# Patient Record
Sex: FEMALE | Race: WHITE | NOT HISPANIC OR LATINO
[De-identification: names, ages, dates, MRNs, and addresses within clinical notes are randomized per-mention and may not be internally consistent; named-entity substitution may affect disease eponyms.]

---

## 2020-01-30 PROBLEM — Z00.00 ENCOUNTER FOR PREVENTIVE HEALTH EXAMINATION: Status: ACTIVE | Noted: 2020-01-30

## 2020-02-06 ENCOUNTER — RESULT REVIEW (OUTPATIENT)
Age: 85
End: 2020-02-06

## 2020-02-06 ENCOUNTER — APPOINTMENT (OUTPATIENT)
Dept: WOUND CARE | Facility: HOSPITAL | Age: 85
End: 2020-02-06
Payer: MEDICARE

## 2020-02-06 VITALS
SYSTOLIC BLOOD PRESSURE: 152 MMHG | RESPIRATION RATE: 18 BRPM | DIASTOLIC BLOOD PRESSURE: 82 MMHG | HEART RATE: 85 BPM | TEMPERATURE: 97.8 F

## 2020-02-06 VITALS — BODY MASS INDEX: 24.54 KG/M2 | HEIGHT: 60 IN | WEIGHT: 125 LBS

## 2020-02-06 DIAGNOSIS — Z78.9 OTHER SPECIFIED HEALTH STATUS: ICD-10-CM

## 2020-02-06 PROCEDURE — 11044 DBRDMT BONE 1ST 20 SQ CM/<: CPT

## 2020-02-06 PROCEDURE — 99205 OFFICE O/P NEW HI 60 MIN: CPT | Mod: 25

## 2020-02-11 LAB — BACTERIA SPEC CULT: ABNORMAL

## 2020-02-13 ENCOUNTER — APPOINTMENT (OUTPATIENT)
Dept: WOUND CARE | Facility: CLINIC | Age: 85
End: 2020-02-13
Payer: MEDICARE

## 2020-02-13 PROCEDURE — 99214 OFFICE O/P EST MOD 30 MIN: CPT | Mod: 25

## 2020-02-13 PROCEDURE — 11044 DBRDMT BONE 1ST 20 SQ CM/<: CPT | Mod: 58

## 2020-02-14 NOTE — PLAN
[FreeTextEntry1] : 2/6/20Plan - culture obtained, \par \par pathology obtained, awaiting\par  script to pharmacy- renewed

## 2020-02-14 NOTE — PHYSICAL EXAM
[Normal Breath Sounds] : Normal breath sounds [Normal Rate and Rhythm] : normal rate and rhythm [4 x 4] : 4 x 4  [JVD] : no jugular venous distention  [de-identified] : nad [de-identified] : pus [de-identified] : infected [de-identified] : posterior malleolus left [de-identified] : .6 [de-identified] : .9 [de-identified] : 0.5 [de-identified] : gent [de-identified] : .7x.8 bruise dti 5th mt mid [FreeTextEntry1] : 4 th lateral toe web [FreeTextEntry2] : 1 [FreeTextEntry4] : 0.2 [FreeTextEntry3] : 0.9 [de-identified] : iodorb [de-identified] : 1/1/0.2 [FreeTextEntry7] : 5 th toe left [FreeTextEntry8] : 2.5 [FreeTextEntry9] : 3.5 [de-identified] : 0.3 [de-identified] : portion of necrotic bone removed [de-identified] : betadine [FreeTextEntry6] : toe cyanotic/gangrenous [de-identified] : 1.5/1/0.3 [TWNoteComboBox6] : Arterial [de-identified] : Macerated [de-identified] : Mild [de-identified] : Yes [de-identified] : <20% [de-identified] : 50% [de-identified] : 2.5% Lidocaine Topical [de-identified] : Arterial [de-identified] : Mild [de-identified] : Yes [de-identified] : 50% [de-identified] : <20% [de-identified] : 2.5% Lidocaine Topical [de-identified] : Left [de-identified] : Yes [de-identified] : Traumatic [de-identified] : 2.5% Lidocaine Topical [de-identified] : Mechanical [de-identified] : 100% [TWNoteComboBox1] : Left [de-identified] : 2.5% Lidocaine Topical [de-identified] : Bone [de-identified] : 100% [TWNoteComboBox7] : Mechanical [TWNoteComboBox9] : Left [de-identified] : 2.5% Lidocaine Topical [de-identified] : Bone [TWNoteComboBox8] : Citlali [de-identified] : Debridement non-excisional

## 2020-02-14 NOTE — ASSESSMENT
[FreeTextEntry1] : 89 yr old woman CAD HTN on AC with necrotic gangrenous left 5th toe, medial and lateral left leg ulcers on ac afib, htn and s/p cad with stents\par will extend antibiotics to 10 days/ tough stick\par  3 months  duration.   pain and drainage.  \par denies fevers or chills.  no change in urine-  wants to wait on labs for renal function\par  gent on wounds and dakins wet to dry\par   worse necrotic, foul smelling wounds  on 4th toe tolerated sharp debridement well, minimal bleeding\par  left calf wounds improved \par await path for 5th toe with osteo- will need ID referral

## 2020-02-14 NOTE — HISTORY OF PRESENT ILLNESS
[FreeTextEntry1] : Ms. LOTTE BLOCH   presents to the office with a wound for 3 months  duration.  \par had been seen using dakins wet to dry and gent on wounds\par culture positive for ecoli and enterococus faecalis\par crushing the antibiotic cipro and flagyl\par The wounds are is located on  the left calf  and 5th toe. Portion of toe bone sent for r/o osteomyelitis, gangrenous 5th toe , previous mackenzie 1.2 in 2012 , probable pad now at age  89\par \par .  The patient has complaints of pain and drainage. \par  The patient previouslywith neopsporin. The patient denies fevers or chills.  \par The patient has localized pain to the wound upon dressing changes. \par  The patient has no other complaints or associated symptoms.  The pt. had fallen approx. 3 months ago was initially taken to [facility], then followed up with primary care who placed on doxy., just started 2nd course of it.\par \par

## 2020-02-21 ENCOUNTER — APPOINTMENT (OUTPATIENT)
Dept: WOUND CARE | Facility: CLINIC | Age: 85
End: 2020-02-21
Payer: MEDICARE

## 2020-02-21 ENCOUNTER — INPATIENT (INPATIENT)
Facility: HOSPITAL | Age: 85
LOS: 6 days | Discharge: HOME | DRG: 300 | End: 2020-02-28
Attending: INTERNAL MEDICINE | Admitting: INTERNAL MEDICINE
Payer: MEDICARE

## 2020-02-21 VITALS
HEIGHT: 60 IN | RESPIRATION RATE: 18 BRPM | HEART RATE: 88 BPM | SYSTOLIC BLOOD PRESSURE: 126 MMHG | WEIGHT: 123.02 LBS | DIASTOLIC BLOOD PRESSURE: 84 MMHG | OXYGEN SATURATION: 96 % | TEMPERATURE: 98 F

## 2020-02-21 VITALS
DIASTOLIC BLOOD PRESSURE: 82 MMHG | SYSTOLIC BLOOD PRESSURE: 126 MMHG | HEART RATE: 76 BPM | TEMPERATURE: 97.5 F | RESPIRATION RATE: 16 BRPM

## 2020-02-21 DIAGNOSIS — I96 GANGRENE, NOT ELSEWHERE CLASSIFIED: ICD-10-CM

## 2020-02-21 DIAGNOSIS — Z90.10 ACQUIRED ABSENCE OF UNSPECIFIED BREAST AND NIPPLE: Chronic | ICD-10-CM

## 2020-02-21 LAB
ALBUMIN SERPL ELPH-MCNC: 3.3 G/DL — SIGNIFICANT CHANGE UP (ref 3.3–5)
ALP SERPL-CCNC: 100 U/L — SIGNIFICANT CHANGE UP (ref 40–120)
ALT FLD-CCNC: 13 U/L — SIGNIFICANT CHANGE UP (ref 10–45)
ANION GAP SERPL CALC-SCNC: 12 MMOL/L — SIGNIFICANT CHANGE UP (ref 5–17)
APTT BLD: 30.4 SEC — SIGNIFICANT CHANGE UP (ref 27.5–36.3)
AST SERPL-CCNC: 34 U/L — SIGNIFICANT CHANGE UP (ref 10–40)
BASE EXCESS BLDV CALC-SCNC: 4.8 MMOL/L — HIGH (ref -2–2)
BASOPHILS # BLD AUTO: 0.1 K/UL — SIGNIFICANT CHANGE UP (ref 0–0.2)
BASOPHILS NFR BLD AUTO: 0.6 % — SIGNIFICANT CHANGE UP (ref 0–2)
BILIRUB SERPL-MCNC: 0.3 MG/DL — SIGNIFICANT CHANGE UP (ref 0.2–1.2)
BUN SERPL-MCNC: 20 MG/DL — SIGNIFICANT CHANGE UP (ref 7–23)
CA-I SERPL-SCNC: 1.15 MMOL/L — SIGNIFICANT CHANGE UP (ref 1.12–1.3)
CALCIUM SERPL-MCNC: 9.4 MG/DL — SIGNIFICANT CHANGE UP (ref 8.4–10.5)
CHLORIDE BLDV-SCNC: 101 MMOL/L — SIGNIFICANT CHANGE UP (ref 96–108)
CHLORIDE SERPL-SCNC: 96 MMOL/L — SIGNIFICANT CHANGE UP (ref 96–108)
CO2 BLDV-SCNC: 32 MMOL/L — HIGH (ref 22–30)
CO2 SERPL-SCNC: 26 MMOL/L — SIGNIFICANT CHANGE UP (ref 22–31)
CREAT SERPL-MCNC: 0.91 MG/DL — SIGNIFICANT CHANGE UP (ref 0.5–1.3)
EOSINOPHIL # BLD AUTO: 0.37 K/UL — SIGNIFICANT CHANGE UP (ref 0–0.5)
EOSINOPHIL NFR BLD AUTO: 2.1 % — SIGNIFICANT CHANGE UP (ref 0–6)
ERYTHROCYTE [SEDIMENTATION RATE] IN BLOOD: 90 MM/HR — HIGH (ref 0–20)
GAS PNL BLDV: 132 MMOL/L — LOW (ref 135–145)
GAS PNL BLDV: SIGNIFICANT CHANGE UP
GAS PNL BLDV: SIGNIFICANT CHANGE UP
GLUCOSE BLDV-MCNC: 94 MG/DL — SIGNIFICANT CHANGE UP (ref 70–99)
GLUCOSE SERPL-MCNC: 95 MG/DL — SIGNIFICANT CHANGE UP (ref 70–99)
HCO3 BLDV-SCNC: 30 MMOL/L — HIGH (ref 21–29)
HCT VFR BLD CALC: 33.9 % — LOW (ref 34.5–45)
HCT VFR BLDA CALC: 34 % — LOW (ref 39–50)
HGB BLD CALC-MCNC: 10.9 G/DL — LOW (ref 11.5–15.5)
HGB BLD-MCNC: 10.5 G/DL — LOW (ref 11.5–15.5)
IMM GRANULOCYTES NFR BLD AUTO: 1.5 % — SIGNIFICANT CHANGE UP (ref 0–1.5)
INR BLD: 1.22 RATIO — HIGH (ref 0.88–1.16)
LACTATE BLDV-MCNC: 2 MMOL/L — SIGNIFICANT CHANGE UP (ref 0.7–2)
LYMPHOCYTES # BLD AUTO: 14.6 % — SIGNIFICANT CHANGE UP (ref 13–44)
LYMPHOCYTES # BLD AUTO: 2.55 K/UL — SIGNIFICANT CHANGE UP (ref 1–3.3)
MCHC RBC-ENTMCNC: 28.2 PG — SIGNIFICANT CHANGE UP (ref 27–34)
MCHC RBC-ENTMCNC: 31 GM/DL — LOW (ref 32–36)
MCV RBC AUTO: 90.9 FL — SIGNIFICANT CHANGE UP (ref 80–100)
MONOCYTES # BLD AUTO: 1.2 K/UL — HIGH (ref 0–0.9)
MONOCYTES NFR BLD AUTO: 6.9 % — SIGNIFICANT CHANGE UP (ref 2–14)
NEUTROPHILS # BLD AUTO: 12.98 K/UL — HIGH (ref 1.8–7.4)
NEUTROPHILS NFR BLD AUTO: 74.3 % — SIGNIFICANT CHANGE UP (ref 43–77)
NRBC # BLD: 0 /100 WBCS — SIGNIFICANT CHANGE UP (ref 0–0)
PCO2 BLDV: 51 MMHG — HIGH (ref 35–50)
PH BLDV: 7.39 — SIGNIFICANT CHANGE UP (ref 7.35–7.45)
PLATELET # BLD AUTO: 385 K/UL — SIGNIFICANT CHANGE UP (ref 150–400)
PO2 BLDV: 30 MMHG — SIGNIFICANT CHANGE UP (ref 25–45)
POTASSIUM BLDV-SCNC: 3.5 MMOL/L — SIGNIFICANT CHANGE UP (ref 3.5–5.3)
POTASSIUM SERPL-MCNC: 4.2 MMOL/L — SIGNIFICANT CHANGE UP (ref 3.5–5.3)
POTASSIUM SERPL-SCNC: 4.2 MMOL/L — SIGNIFICANT CHANGE UP (ref 3.5–5.3)
PROCALCITONIN SERPL-MCNC: 0.09 NG/ML — SIGNIFICANT CHANGE UP (ref 0.02–0.1)
PROT SERPL-MCNC: 7.4 G/DL — SIGNIFICANT CHANGE UP (ref 6–8.3)
PROTHROM AB SERPL-ACNC: 14 SEC — HIGH (ref 10–12.9)
RBC # BLD: 3.73 M/UL — LOW (ref 3.8–5.2)
RBC # FLD: 16.4 % — HIGH (ref 10.3–14.5)
SAO2 % BLDV: 42 % — LOW (ref 67–88)
SODIUM SERPL-SCNC: 134 MMOL/L — LOW (ref 135–145)
WBC # BLD: 17.47 K/UL — HIGH (ref 3.8–10.5)
WBC # FLD AUTO: 17.47 K/UL — HIGH (ref 3.8–10.5)

## 2020-02-21 PROCEDURE — 99214 OFFICE O/P EST MOD 30 MIN: CPT

## 2020-02-21 PROCEDURE — 99222 1ST HOSP IP/OBS MODERATE 55: CPT

## 2020-02-21 PROCEDURE — 99285 EMERGENCY DEPT VISIT HI MDM: CPT

## 2020-02-21 PROCEDURE — 99203 OFFICE O/P NEW LOW 30 MIN: CPT

## 2020-02-21 PROCEDURE — 93010 ELECTROCARDIOGRAM REPORT: CPT

## 2020-02-21 RX ORDER — APIXABAN 2.5 MG/1
2.5 TABLET, FILM COATED ORAL EVERY 12 HOURS
Refills: 0 | Status: DISCONTINUED | OUTPATIENT
Start: 2020-02-21 | End: 2020-02-27

## 2020-02-21 RX ORDER — AZTREONAM 2 G
1000 VIAL (EA) INJECTION ONCE
Refills: 0 | Status: COMPLETED | OUTPATIENT
Start: 2020-02-21 | End: 2020-02-21

## 2020-02-21 RX ORDER — AZTREONAM 2 G
1000 VIAL (EA) INJECTION EVERY 8 HOURS
Refills: 0 | Status: DISCONTINUED | OUTPATIENT
Start: 2020-02-22 | End: 2020-02-26

## 2020-02-21 RX ORDER — SODIUM CHLORIDE 9 MG/ML
500 INJECTION INTRAMUSCULAR; INTRAVENOUS; SUBCUTANEOUS ONCE
Refills: 0 | Status: COMPLETED | OUTPATIENT
Start: 2020-02-21 | End: 2020-02-21

## 2020-02-21 RX ORDER — PENTOXIFYLLINE 400 MG
400 TABLET, EXTENDED RELEASE ORAL
Refills: 0 | Status: DISCONTINUED | OUTPATIENT
Start: 2020-02-21 | End: 2020-02-28

## 2020-02-21 RX ORDER — ACETAMINOPHEN 500 MG
650 TABLET ORAL EVERY 6 HOURS
Refills: 0 | Status: DISCONTINUED | OUTPATIENT
Start: 2020-02-21 | End: 2020-02-28

## 2020-02-21 RX ORDER — FUROSEMIDE 40 MG
40 TABLET ORAL DAILY
Refills: 0 | Status: DISCONTINUED | OUTPATIENT
Start: 2020-02-21 | End: 2020-02-28

## 2020-02-21 RX ORDER — METOPROLOL TARTRATE 50 MG
50 TABLET ORAL DAILY
Refills: 0 | Status: DISCONTINUED | OUTPATIENT
Start: 2020-02-21 | End: 2020-02-28

## 2020-02-21 RX ORDER — VANCOMYCIN HCL 1 G
1000 VIAL (EA) INTRAVENOUS ONCE
Refills: 0 | Status: COMPLETED | OUTPATIENT
Start: 2020-02-21 | End: 2020-02-21

## 2020-02-21 RX ADMIN — Medication 50 MILLIGRAM(S): at 16:45

## 2020-02-21 RX ADMIN — APIXABAN 2.5 MILLIGRAM(S): 2.5 TABLET, FILM COATED ORAL at 22:41

## 2020-02-21 RX ADMIN — SODIUM CHLORIDE 500 MILLILITER(S): 9 INJECTION INTRAMUSCULAR; INTRAVENOUS; SUBCUTANEOUS at 16:44

## 2020-02-21 RX ADMIN — Medication 250 MILLIGRAM(S): at 18:05

## 2020-02-21 NOTE — H&P ADULT - NSICDXPASTSURGICALHX_GEN_ALL_CORE_FT
[de-identified] : Pt presents for evaluation -\par Foot is doing a bit better - not using boot anymore.\par Trying to lose weight - would like to try medication.\par I have counselled her that OTC meds have more side effects versus benefit. PAST SURGICAL HISTORY:  History of total mastectomy

## 2020-02-21 NOTE — PHYSICAL EXAM
[Normal Breath Sounds] : Normal breath sounds [Normal Rate and Rhythm] : normal rate and rhythm [4 x 4] : 4 x 4  [JVD] : no jugular venous distention  [de-identified] : nad [de-identified] : infected [de-identified] : posterior malleolus left [de-identified] : .6 [de-identified] : .9 [de-identified] : 0.5 [de-identified] : gent [de-identified] : .7x.8 bruise dti 5th mt mid [FreeTextEntry1] : 4 th lateral toe web [de-identified] : gangrene 4,5 toes left foot [FreeTextEntry2] : 1 [FreeTextEntry4] : 0.2 [FreeTextEntry3] : 0.9 [de-identified] : iodorb [de-identified] : 1/1/0.2 [FreeTextEntry7] : 5 th toe left [FreeTextEntry8] : 2.5 [FreeTextEntry9] : 3.5 [de-identified] : portion of necrotic bone removed [de-identified] : 0.3 [FreeTextEntry6] : toe cyanotic/gangrenous [de-identified] : betadine [de-identified] : 1.5/1/0.3 [TWNoteComboBox5] : No [de-identified] : Normal [TWNoteComboBox6] : Traumatic [de-identified] : None [de-identified] : <20% [de-identified] : <20% [de-identified] : 2.5% Lidocaine Topical [de-identified] : Yes [de-identified] : Arterial [de-identified] : Mild [de-identified] : 50% [de-identified] : <20% [de-identified] : Yes [de-identified] : 2.5% Lidocaine Topical [de-identified] : Left [de-identified] : Traumatic [de-identified] : Yes [de-identified] : Mechanical [TWNoteComboBox1] : Left [de-identified] : 100% [TWNoteComboBox7] : Mechanical [de-identified] : Bone [TWNoteComboBox9] : Left [de-identified] : 100% [de-identified] : Bone [TWNoteComboBox8] : Citlali [de-identified] : Debridement non-excisional

## 2020-02-21 NOTE — H&P ADULT - NSHPSOCIALHISTORY_GEN_ALL_CORE
Social History:    Marital Status:  (   )    (   ) Single    (   )    ( x )   Occupation:   Lives with: (  ) alone  ( x ) children   (  ) spouse   (  ) parents  (  ) other    Substance Use (street drugs): ( x ) never used  (  ) other:  Tobacco Usage:  (x   ) never smoked   (   ) former smoker   (   ) current smoker  (     ) pack years  (        ) last cigarette date  Alcohol Usage: denies    (     ) Advanced Directives: (     ) None    (      ) DNR    (     ) DNI    (     ) Health Care Proxy:

## 2020-02-21 NOTE — ASSESSMENT
[FreeTextEntry1] : 89 yr old woman CAD HTN on AC with necrotic gangrenous left 5th toe, medial and lateral left leg ulcers on ac afib, htn and s/p cad with stents\par now 4th toe completely ischemic\par I recommended patient be sent to er for admit, Medicine, Id and vascular assessment\par \par less pain and drainage.  \par denies fevers or chills. \par did not get labs for renal function\par gent on wounds and dakins wet to dry\par Discussed possible treatment plans with daughters. Gangrene may progress and patient could end up losing all of her toes with a TMA. Patient may also end up with BK amp due to necrotic/fibrotic leg wounds. Patient's family may elect to refuse amputations but vascular evaluation is needed prior to any surgical management\par Patient's daughter told that if vascular is able to restore circulation, the inflammatory response would increase and risk infection spreading which could also result in BKAmp\par patient's daughter told that gangrene and infection could spread if nothing is done and patient could become septic\par \par \par 2.21.2020\par worse necrotic, foul smelling wounds  on 4th toe  \par path for 5th toe with osteo \par \par Recommendation-  report to [facility] for admission and assessment/evaluation to R/O Gangrene.\par Consulted Dr. Rodrigo Collins ID\par Dr. Alayna Guajardo Medicne\par Dr. Dimitri Root Vascular

## 2020-02-21 NOTE — PHYSICAL EXAM
[Normal Breath Sounds] : Normal breath sounds [Normal Rate and Rhythm] : normal rate and rhythm [JVD] : no jugular venous distention  [de-identified] : nad [4 x 4] : 4 x 4  [de-identified] : infected [de-identified] : posterior malleolus left [de-identified] : .6 [de-identified] : .9 [de-identified] : gent [de-identified] : 0.5 [de-identified] : .7x.8 bruise dti 5th mt mid [FreeTextEntry1] : 4 th lateral toe web [de-identified] : gangrene 4,5 toes left foot [FreeTextEntry4] : 0.2 [de-identified] : iodorb [FreeTextEntry2] : 1 [FreeTextEntry3] : 0.9 [FreeTextEntry7] : 5 th toe left [FreeTextEntry8] : 2.5 [de-identified] : 1/1/0.2 [FreeTextEntry9] : 3.5 [de-identified] : 0.3 [de-identified] : portion of necrotic bone removed [FreeTextEntry6] : toe cyanotic/gangrenous [de-identified] : betadine [de-identified] : 1.5/1/0.3 [TWNoteComboBox5] : No [TWNoteComboBox6] : Traumatic [de-identified] : Normal [de-identified] : None [de-identified] : <20% [de-identified] : 2.5% Lidocaine Topical [de-identified] : <20% [de-identified] : Yes [de-identified] : 50% [de-identified] : Arterial [de-identified] : Mild [de-identified] : 2.5% Lidocaine Topical [de-identified] : Yes [de-identified] : <20% [de-identified] : Traumatic [de-identified] : Yes [de-identified] : Left [de-identified] : 100% [de-identified] : Mechanical [de-identified] : Bone [TWNoteComboBox1] : Left [TWNoteComboBox9] : Left [TWNoteComboBox7] : Mechanical [de-identified] : Bone [de-identified] : 100% [TWNoteComboBox8] : Citlali [de-identified] : Debridement non-excisional

## 2020-02-21 NOTE — H&P ADULT - ASSESSMENT
89 f with    Foot ulceration/ Gangrene  - wound care  - Vascular evaluation  - JERE/ PVR  - Podiatry evaluation   - antibiotics  - ID evaluation    Afib   - rate control  - continue AC    Dementia   - supportive care   - TSH, B12    HTN (hypertension)   -  control    PVD (peripheral vascular disease)  - Vascular evaluation    Further action as per clinical course     John Guajardo MD pager [phone]

## 2020-02-21 NOTE — HISTORY OF PRESENT ILLNESS
[FreeTextEntry1] : Ms. LOTTE BLOCH   presents to the wound center with a wound for 3 months  duration.  \par had been seen using dakins wet to dry and gent on wounds\par Dr. Ferguson has been treating patient and requests podiatry evaluation\par \par path positive for acute osteo\par did not see id\par pt sometimes non- compliant\par \par culture positive for ecoli and enterococus faecalis\par crushing the antibiotic cipro and flagyl\par The wounds are is located on  the left calf  and 5th toe. Portion of toe bone sent for r/o osteomyelitis, gangrenous 5th toe , previous mackenzie 1.2 in 2012 , probable pad now at age  89\par \par  The patient has complaints of pain and drainage. Presents with daughters\par  The patient previouslywith neopsporin. The patient denies fevers or chills.  \par The patient has localized pain to the wound upon dressing changes. \par  The patient has no other complaints or associated symptoms.  The pt. had fallen approx. 3 months ago was initially taken to [facility], then followed up with primary care who placed on doxy., just started 2nd course of it.\par \par gangrene 5th toe has now progressed to 4th toe according to 4th toe left foot

## 2020-02-21 NOTE — ASSESSMENT
[FreeTextEntry1] : 89 yr old woman CAD HTN on AC with necrotic gangrenous left 5th toe, medial and lateral left leg ulcers on ac afib, htn and s/p cad with stents\par now 4th toe completely ischemic\par I recommended patient be sent to er for admit, Medicine, Id and vascular assessment\par \par less pain and drainage.  \par denies fevers or chills. \par did not get labs for renal function\par gent on wounds and dakins wet to dry\par Discussed possible treatment plans with daughters. Gangrene may progress and patient could end up losing all of her toes with a TMA. Patient may also end up with BK amp due to necrotic/fibrotic leg wounds. Patient's family may elect to refuse amputations but vascular evaluation is needed prior to any surgical management\par Patient's daughter told that if vascular is able to restore circulation, the inflammatory response would increase and risk infection spreading which could also result in BKAmp\par patient's daughter told that gangrene and infection could spread if nothing is done and patient could become septic\par \par \par 2.21.2020\par worse necrotic, foul smelling wounds  on 4th toe  \par path for 5th toe with osteo \par \par Recommendation-  report to [facility] for admission and assessment/evaluation to R/O Gangrene.\par Consulted Dr. Rodrigo Collins ID\par Dr. Alayna Guajadro Medicne\par Dr. Dimitri Root Vascular

## 2020-02-21 NOTE — ED PROVIDER NOTE - SKIN COLOR
Gangrenous 4th and 5th toes left foot. Erythema noted from foot to lateral ankle up to large stage III-IV ulce

## 2020-02-21 NOTE — ED PROVIDER NOTE - ATTENDING CONTRIBUTION TO CARE
Attending MD Walters:   I personally have seen and examined this patient.  Physician assistant note reviewed and agree on plan of care and except where noted.  See HPI for details.

## 2020-02-21 NOTE — ED ADULT NURSE REASSESSMENT NOTE - NS ED NURSE REASSESS COMMENT FT1
Pt, NAD, resp nonlabored, resting comfortably in bed with family at bedside . Pt denies headache, dizziness, chest pain, palpitations, SOB, abd pain, n/v/d, urinary symptoms, fevers, chills, weakness at this time. Pt admitted awaiting bed. Safety maintained.

## 2020-02-21 NOTE — H&P ADULT - NSHPLABSRESULTS_GEN_ALL_CORE
10.5   17.47 )-----------( 385      ( 21 Feb 2020 16:41 )             33.9       02-21    134<L>  |  96  |  20  ----------------------------<  95  4.2   |  26  |  0.91    Ca    9.4      21 Feb 2020 16:41    TPro  7.4  /  Alb  3.3  /  TBili  0.3  /  DBili  x   /  AST  34  /  ALT  13  /  AlkPhos  100  02-21                  PT/INR - ( 21 Feb 2020 16:41 )   PT: 14.0 sec;   INR: 1.22 ratio         PTT - ( 21 Feb 2020 16:41 )  PTT:30.4 sec    Lactate Trend            CAPILLARY BLOOD GLUCOSE

## 2020-02-21 NOTE — HISTORY OF PRESENT ILLNESS
[FreeTextEntry1] : Ms. LOTTE BLOCH   presents to the wound center with a wound for 3 months  duration.  \par had been seen using dakins wet to dry and gent on wounds\par Dr. Ferguson has been treating patient and requests podiatry evaluation\par \par path positive for acute osteo\par did not see id\par pt sometimes non- compliant\par \par culture positive for ecoli and enterococus faecalis\par crushing the antibiotic cipro and flagyl\par The wounds are is located on  the left calf  and 5th toe. Portion of toe bone sent for r/o osteomyelitis, gangrenous 5th toe , previous mackenzie 1.2 in 2012 , probable pad now at age  89\par \par  The patient has complaints of pain and drainage. Presents with daughters\par  The patient previouslywith neopsporin. The patient denies fevers or chills.  \par The patient has localized pain to the wound upon dressing changes. \par  The patient has no other complaints or associated symptoms.  The pt. had fallen approx. 3 months ago was initially taken to [facility], then followed up with primary care who placed on doxy., just started 2nd course of it.\par \par gangrene 5th toe has now progressed to 4th toe according to 4th toe left foot

## 2020-02-21 NOTE — ASSESSMENT
[FreeTextEntry1] : 89 yr old woman CAD HTN on AC with necrotic gangrenous left 5th toe, medial and lateral left leg ulcers on ac afib, htn and s/p cad with stents\par now 4th toe completely ischemic\par I recommended patient be sent to er for admit, Medicine, Id and vascular assessment\par \par less pain and drainage.  \par denies fevers or chills. \par did not get labs for renal function\par gent on wounds and dakins wet to dry\par Discussed possible treatment plans with daughters. Gangrene may progress and patient could end up losing all of her toes with a TMA. Patient may also end up with BK amp due to necrotic/fibrotic leg wounds. Patient's family may elect to refuse amputations but vascular evaluation is needed prior to any surgical management\par Patient's daughter told that if vascular is able to restore circulation, the inflammatory response would increase and risk infection spreading which could also result in BKAmp\par patient's daughter told that gangrene and infection could spread if nothing is done and patient could become septic\par \par \par 2.21.2020\par worse necrotic, foul smelling wounds  on 4th toe  \par path for 5th toe with osteo \par \par Recommendation-  report to [facility] for admission and assessment/evaluation to R/O Gangrene.\par Consulted Dr. Rodrigo Collins ID\par Dr. Alayna Guajardo Medicne\par Dr. Dimitri Root Vascular

## 2020-02-21 NOTE — ED PROVIDER NOTE - OBJECTIVE STATEMENT
Attending MD Walters:   I personally have seen and examined this patient.  Physician assistant note reviewed and agree on plan of care and except where noted.  See below for details.     Seen in [facility], accompanied by daughter, Lashay  Daughter providing history    89F with PMH/PSH including AFib on Eliquis and Metoprolol, dementia, ?HF on furosemide presents to the ED sent in by podiatrist Dr. Ferguson for gangrene of toes on the L foot.  Daughter reports that she has been following with wound care, Dr. Ferguson, and recently completed a course of Flagyl and Ciprofloxacin.  Reports was started on Pentoxifylline last week for attempt at improved blood flow.  Reports     Daughter denies recent illness including fevers, cough, vomiting, diarrhea, change in urinary habits.  Unable to obtain ROS from patient given dementia. Attending MD Walters:   I personally have seen and examined this patient.  Physician assistant note reviewed and agree on plan of care and except where noted.  See below for details.     Seen in [facility], accompanied by daughter, Lashay  PMD  Victor Hugo Franklin  Daughter providing history    89F with PMH/PSH including AFib on Eliquis and Metoprolol, dementia, ?HF on furosemide, s/p double mastectomy for cystic breasts (1970s) presents to the ED sent in by podiatrist Dr. Linder and wound doctor Dr. Ferguson for gangrene of toes on the L foot.  Daughter reports that she has been following with wound care, Dr. Ferguson, and recently completed a course of Flagyl and Ciprofloxacin.  Reports was started on Pentoxifylline last week for attempt at improved blood flow.  Reports ankle wound is about 2.5 yrs old, reports the wounds on the distal lower leg are from 11/21/19.  Reports the toe wounds are from beginning of January.  Reports she had a scrape on the distal lower leg, ?from a transfer, initially went to an ED because could not control bleeding.  Reports then followed with PMD for both the toes and the distal lower leg until 2/6/20 when she began seeing wound care.  Reports on 1/30/20 PMD reported wounds malodorous and was referred to wound center but toes already turning black.  Reports on 2/6/20 Dr. Ferguson Rx'ed Flagy/Cipro for her toes.  Reports completed course of Flagyl and Cipro.  Reports today was her third visit with wound care and was sent in by Mando Linder and Phyllis "Admit to [facility], Medicine ID [facility], Vascular Dr. Dimitri Root, LEFT foot gangrene 4-5 toes, PVD [illegible]".  Daughter denies recent illness including fevers, cough, vomiting, diarrhea, change in urinary habits.  Unable to obtain ROS from patient given dementia.  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    PCN allergy (?hives, unknown), sulfa (unknown reaction) Attending MD Walters:   I personally have seen and examined this patient.  Physician assistant note reviewed and agree on plan of care and except where noted.  See below for details.     Seen in [facility], accompanied by daughter, Lashay  PMD  Victor Hugo Franklin  Daughter providing history    89F with PMH/PSH including AFib on Eliquis and Metoprolol, dementia, ?HF on furosemide, s/p double mastectomy for cystic breasts (1970s) presents to the ED sent in by podiatrist Dr. Linder and wound doctor Dr. Ferguson for gangrene of toes on the L foot.  Daughter reports that she has been following with wound care, Dr. Ferguson, and recently completed a course of Flagyl and Ciprofloxacin.  Reports was started on Pentoxifylline last week for attempt at improved blood flow.  Reports ankle wound is about 2.5 yrs old, reports the wounds on the distal lower leg are from 11/21/19.  Reports the toe wounds are from beginning of January.  Reports she had a scrape on the distal lower leg, ?from a transfer, initially went to an ED because could not control bleeding.  Reports then followed with PMD for both the toes and the distal lower leg until 2/6/20 when she began seeing wound care.  Reports on 1/30/20 PMD reported wounds malodorous and was referred to wound center but toes already turning black.  Reports on 2/6/20 Dr. Ferguson Rx'ed Flagy/Cipro for her toes.  Reports completed course of Flagyl and Cipro.  Reports today was her third visit with wound care and was sent in by Mando Linder and Phyllis "Admit to [facility], Medicine ID [facility], Vascular Dr. Dimitri Root, LEFT foot gangrene 4-5 toes, PVD [illegible]".  Daughter denies recent illness including fevers, cough, vomiting, diarrhea, change in urinary habits.  Unable to obtain ROS from patient given dementia.  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    PCN allergy (?hives, unknown), sulfa (unknown reaction)    PA note:89 yo F wtih pmhx Afib(on elliquis), possible HF, htn, hld and dementia BIBA accompanied by daughter from wound center for Left foot gangrene. As per daughter, patient with mutliple wounds to the left foot. patient states in the end of November she scrapped her Left shin and had bleeding. Since then she has had an open wound to the left shin she is seeing the wound center for. As per daughter, a couple of weeks ago patient had a cut in between the left 4-5th toe. Patient found to having gengrenous toes today at the wound care center and sent in for admission by Dr Lemus. As per daughter, patient just finished cipro and flagyl for the wounds. Patient and daughter deny fever and history of dm Attending MD Walters:   I personally have seen and examined this patient.  Physician assistant note reviewed and agree on plan of care and except where noted.  See below for details.     Seen in [facility], accompanied by daughter, Lashay  PMD  Victor Hugo Franklin  Daughter providing history    89F with PMH/PSH including AFib on Eliquis and Metoprolol, dementia, ?HF on furosemide, s/p double mastectomy for cystic breasts (1970s) presents to the ED sent in by podiatrist Dr. Linder and wound doctor Dr. Ferguson for gangrene of toes on the L foot.  Daughter reports that she has been following with wound care, Dr. Ferguson, and recently completed a course of Flagyl and Ciprofloxacin.  Reports was started on Pentoxifylline last week for attempt at improved blood flow.  Reports ankle wound is about 2.5 yrs old, reports the wounds on the distal lower leg are from 11/21/19.  Reports the toe wounds are from beginning of January.  Reports she had a scrape on the distal lower leg, ?from a transfer, initially went to an ED because could not control bleeding.  Reports then followed with PMD for both the toes and the distal lower leg until 2/6/20 when she began seeing wound care.  Reports on 1/30/20 PMD reported wounds malodorous and was referred to wound center but toes already turning black.  Reports on 2/6/20 Dr. Ferguson Rx'ed Flagy/Cipro for her toes.  Reports completed course of Flagyl and Cipro.  Reports today was her third visit with wound care and was sent in by Mando Linder and Phyllis "Admit to [facility], Medicine ID [facility], Vascular Dr. Dimitri Root, LEFT foot gangrene 4-5 toes, PVD [illegible]".  Daughter denies recent illness including fevers, cough, vomiting, diarrhea, change in urinary habits.  Unable to obtain ROS from patient given dementia.  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    PCN allergy (?hives, unknown), sulfa (unknown reaction)    PA note:89 yo F with pmhx Afib(on elliquis), possible HF, htn, hld and dementia BIBA accompanied by daughter from wound center for Left foot gangrene. As per daughter, patient with mutliple wounds to the left foot. patient states in the end of November she scrapped her Left shin and had bleeding. Since then she has had an open wound to the left shin she is seeing the wound center for. As per daughter, a couple of weeks ago patient had a cut in between the left 4-5th toe. Patient found to having gengrenous toes today at the wound care center and sent in for admission by Dr Lemus. As per daughter, patient just finished cipro and flagyl for the wounds. Patient and daughter deny fever and history of dm

## 2020-02-21 NOTE — HISTORY OF PRESENT ILLNESS
[FreeTextEntry1] : Ms. LOTTE BLOCH   presents to the wound center with a wound for 3 months  duration.  \par had been seen using dakins wet to dry and gent on wounds\par Dr. Ferguson has been treating patient and requests podiatry evaluation\par \par path positive for acute osteo\par did not see id\par pt sometimes non- compliant\par \par culture positive for ecoli and enterococus faecalis\par crushing the antibiotic cipro and flagyl\par The wounds are is located on  the left calf  and 5th toe. Portion of toe bone sent for r/o osteomyelitis, gangrenous 5th toe , previous mackenzie 1.2 in 2012 , probable pad now at age  89\par \par  The patient has complaints of pain and drainage. Presents with daughters\par  The patient previouslywith neopsporin. The patient denies fevers or chills.  \par The patient has localized pain to the wound upon dressing changes. \par  The patient has no other complaints or associated symptoms.  The pt. had fallen approx. 3 months ago was initially taken to [facility], then followed up with primary care who placed on doxy., just started 2nd course of it.\par \par gangrene 5th toe has now progressed to 4th toe according to 4th toe left foot

## 2020-02-21 NOTE — CONSULT NOTE ADULT - SUBJECTIVE AND OBJECTIVE BOX
Patient is a 89y old  Female who presents with a chief complaint of ischemic wounds     HPI:  pt sent in by Dr Ferguson for worsening ischemic wounds on left lower extremity. wounds have been present for a few months. have been applying betadine to toe wounds.     PAST MEDICAL & SURGICAL HISTORY:      MEDICATIONS  (STANDING):  aztreonam  IVPB 1000 milliGRAM(s) IV Intermittent every 6 hours  sodium chloride 0.9% Bolus 500 milliLiter(s) IV Bolus once  vancomycin  IVPB 1000 milliGRAM(s) IV Intermittent once    MEDICATIONS  (PRN):      Allergies    penicillin (Hives)  sulfa drugs (Other)    Intolerances        VITALS:    Vital Signs Last 24 Hrs  T(C): 36.7 (21 Feb 2020 15:41), Max: 36.7 (21 Feb 2020 15:41)  T(F): 98 (21 Feb 2020 15:41), Max: 98 (21 Feb 2020 15:41)  HR: 88 (21 Feb 2020 15:41) (88 - 88)  BP: 126/84 (21 Feb 2020 15:41) (126/84 - 126/84)  BP(mean): --  RR: 18 (21 Feb 2020 15:41) (18 - 18)  SpO2: 96% (21 Feb 2020 15:41) (96% - 96%)    LABS:                CAPILLARY BLOOD GLUCOSE              LOWER EXTREMITY PHYSICAL EXAM:    Vasular: DP/PT 0/4, B/L, CFT absent Left foot 4th + 5th toes,  Temperature gradient warm to cool B/L.   Neuro: Epicritic sensation diminished to the level of toes, B/L.  Musculoskeletal/Ortho: no gross deformities  Skin: dry gangrene to left foot 4th and 5th toes, 2 large leg wounds with fibrogranular base - no acute signs of infection     RADIOLOGY & ADDITIONAL STUDIES:

## 2020-02-21 NOTE — H&P ADULT - NSHPPHYSICALEXAM_GEN_ALL_CORE
PHYSICAL EXAMINATION:  Vital Signs Last 24 Hrs  T(C): 36.7 (21 Feb 2020 15:41), Max: 36.7 (21 Feb 2020 15:41)  T(F): 98 (21 Feb 2020 15:41), Max: 98 (21 Feb 2020 15:41)  HR: 88 (21 Feb 2020 15:41) (88 - 88)  BP: 126/84 (21 Feb 2020 15:41) (126/84 - 126/84)  BP(mean): --  RR: 18 (21 Feb 2020 15:41) (18 - 18)  SpO2: 96% (21 Feb 2020 15:41) (96% - 96%)  CAPILLARY BLOOD GLUCOSE          GENERAL: NAD, well-groomed, well-developed  HEAD:  atraumatic, normocephalic  EYES: sclera anicteric  ENMT: mucous membranes moist  NECK: supple, No JVD  CHEST/LUNG: clear to auscultation bilaterally; no rales, rhonchi, or wheezing b/l  HEART: normal S1, S2  ABDOMEN: BS+, soft, ND, NT   EXTREMITIES:  L foot with heel wound, toes ulcerations  NEURO: awake, confused, interactive; moves all extremities  SKIN: no rashes or lesions

## 2020-02-21 NOTE — H&P ADULT - HISTORY OF PRESENT ILLNESS
89F with PMH/PSH including AFib on Eliquis and Metoprolol, dementia, ?HF on furosemide, s/p double mastectomy for cystic breasts (1970s) presents to the ED sent in by podiatrist Dr. Linder and wound doctor Dr. Ferguson for gangrene of toes on the L foot.  Daughter reports that she has been following with wound care, Dr. Ferguson, and recently completed a course of Flagyl and Ciprofloxacin.  Reports was started on Pentoxifylline last week for attempt at improved blood flow.  Reports ankle wound is about 2.5 yrs old, reports the wounds on the distal lower leg are from 11/21/19.  Reports the toe wounds are from beginning of January.  Reports she had a scrape on the distal lower leg, ?from a transfer, initially went to an ED because could not control bleeding.  Reports then followed with PMD for both the toes and the distal lower leg until 2/6/20 when she began seeing wound care.  Reports on 1/30/20 PMD reported wounds malodorous and was referred to wound center but toes already turning black.  Reports on 2/6/20 Dr. Ferguson Rx'ed Flagy/Cipro for her toes.  Reports completed course of Flagyl and Cipro.  Reports today was her third visit with wound care and was sent in by Mando Linder and Phyllis .  Daughter denies recent illness including fevers, cough, vomiting, diarrhea, change in urinary habits.  Unable to obtain ROS from patient given dementia.  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

## 2020-02-21 NOTE — PHYSICAL EXAM
[Normal Breath Sounds] : Normal breath sounds [Normal Rate and Rhythm] : normal rate and rhythm [4 x 4] : 4 x 4  [JVD] : no jugular venous distention  [de-identified] : nad [de-identified] : infected [de-identified] : .6 [de-identified] : posterior malleolus left [de-identified] : .9 [de-identified] : 0.5 [de-identified] : gent [de-identified] : .7x.8 bruise dti 5th mt mid [FreeTextEntry1] : 4 th lateral toe web [de-identified] : gangrene 4,5 toes left foot [FreeTextEntry2] : 1 [FreeTextEntry3] : 0.9 [FreeTextEntry4] : 0.2 [de-identified] : iodorb [de-identified] : 1/1/0.2 [FreeTextEntry7] : 5 th toe left [FreeTextEntry8] : 2.5 [FreeTextEntry9] : 3.5 [de-identified] : portion of necrotic bone removed [de-identified] : 0.3 [FreeTextEntry6] : toe cyanotic/gangrenous [de-identified] : betadine [de-identified] : 1.5/1/0.3 [TWNoteComboBox5] : No [de-identified] : Normal [TWNoteComboBox6] : Traumatic [de-identified] : None [de-identified] : <20% [de-identified] : <20% [de-identified] : 2.5% Lidocaine Topical [de-identified] : Yes [de-identified] : Arterial [de-identified] : Mild [de-identified] : 50% [de-identified] : <20% [de-identified] : Yes [de-identified] : 2.5% Lidocaine Topical [de-identified] : Left [de-identified] : Traumatic [de-identified] : Yes [de-identified] : Mechanical [TWNoteComboBox1] : Left [de-identified] : 100% [TWNoteComboBox7] : Mechanical [de-identified] : Bone [TWNoteComboBox9] : Left [de-identified] : 100% [de-identified] : Bone [TWNoteComboBox8] : Citlali [de-identified] : Debridement non-excisional

## 2020-02-21 NOTE — H&P ADULT - NSICDXPASTMEDICALHX_GEN_ALL_CORE_FT
PAST MEDICAL HISTORY:  Afib     Dementia     HTN (hypertension)     PVD (peripheral vascular disease)

## 2020-02-21 NOTE — PATIENT PROFILE ADULT - BRADEN FRICTION AND SHEAR
Transfusion Medicine Consultation    Deangelo Fish [id number]   YOB: 1956 Age: 63 year old   Date of Consult: 11/19/2019     Reason for consult: Allogeneic Hematopoietic Progenitor Cell (HPC)  Collection           Assessment and Plan:   63 year old male presents for consultation for allogeneic HPC collection for his borther.  The plan is to collect for 1 to 3 days or until the target goal is met.   The patient does have adequate veins and will not require line placement.     Elevated blood pressure was noted on the visit to apheresis.  He has not had routine medical care and will require follow up. Rosalinda Wilkinson from BMT was notified and will follow up when she meets with him this afternoon.  Once his hypertension is addressed he would be appropriate to move forward as a stem cell donor by apheresis.         Chief Complaint:   Transfusion medicine consultation.         History of Present Illness:   63 year old male presents for consultation for allogeneic  HPC  collection.  He denies any significant pas medical history and does not follow with a physician.  The hypertension is noted on his vital signs at the time of the consult.  He is currently well.   No significant travel history.  The patient has no identifiable risk factors for infectious disease.  The procedure, risks/benefits were discussed with the patient and all of his questions were addressed at this time.             Past Medical History:   Denies significant past medical history          Past Surgical History:   The patient does not recall any significant surgical history           Social History:   Manages a [employer]            Allergies:   No Known Allergies          Medications:     No current outpatient medications on file.     No current facility-administered medications for this encounter.              Review of Systems:     CONSTITUTIONAL:  negative for  fevers and chills  EYES:  negative for  double vision and blurred  "vision  HEENT:  negative for  earaches, nasal congestion, epistaxis and sore throat  RESPIRATORY:  negative for  dry cough and dyspnea  CARDIOVASCULAR:  negative for  chest pain, palpitations, edema, claudication  GASTROINTESTINAL:  negative for nausea, vomiting, change in bowel habits and diarrhea  GENITOURINARY:  negative for dysuria and hematuria  HEMATOLOGIC/LYMPHATIC:  negative for easy bruising and bleeding  NEUROLOGICAL:  negative for headaches, seizures, numbness and tingling           Vital Signs:   BP (!) 169/102   Pulse 66   Temp 98.9  F (37.2  C) (Oral)   Resp 16   Ht 1.785 m (5' 10.28\")   Wt 101.3 kg (223 lb 5.2 oz)   BMI 31.79 kg/m              Data:       ABO   Date Value Ref Range Status   11/19/2019 B  Final     RH(D)   Date Value Ref Range Status   11/19/2019 Pos  Final     Antibody Screen   Date Value Ref Range Status   11/19/2019 Neg  Final       BMP  Recent Labs   Lab 11/19/19  1258      POTASSIUM 4.0   CHLORIDE 107   SUMMER 9.2   CO2 27   BUN 19   CR 0.93   *     CBC  Recent Labs   Lab 11/19/19  1258   WBC 4.8   RBC 5.19   HGB 15.2   HCT 45.7   MCV 88   MCH 29.3   MCHC 33.3   RDW 12.8        INR  Recent Labs   Lab 11/19/19  1258   INR 0.98           Brent Gallegos MD  Transfusion Medicine Attending  Laboratory Medicine and Pathology  Pager [phone]          " (2) potential problem

## 2020-02-21 NOTE — CONSULT NOTE ADULT - SUBJECTIVE AND OBJECTIVE BOX
"HPI: 89F with PMH/PSH including AFib on Eliquis and Metoprolol, dementia, ?HF on furosemide, s/p double mastectomy for cystic breasts (1970s) presents to the ED sent in by podiatrist Dr. Linder and wound doctor Dr. Ferguson for gangrene of toes on the L foot.  Daughter reports that she has been following with wound care, Dr. Ferguson, and recently completed a course of Flagyl and Ciprofloxacin.  Reports was started on Pentoxifylline last week for attempt at improved blood flow.  Reports ankle wound is about 2.5 yrs old, reports the wounds on the distal lower leg are from 11/21/19.  Reports the toe wounds are from beginning of January.  Reports she had a scrape on the distal lower leg, ?from a transfer, initially went to an ED because could not control bleeding.  Reports then followed with PMD for both the toes and the distal lower leg until 2/6/20 when she began seeing wound care.  Reports on 1/30/20 PMD reported wounds malodorous and was referred to wound center but toes already turning black.  Reports on 2/6/20 Dr. Ferguson Rx'ed Flagy/Cipro for her toes.  Reports completed course of Flagyl and Cipro.  Reports today was her third visit with wound care and was sent in by Mando Linder and Phyllis "Admit to John Guajardo, Medicine ID [facility], Vascular Dr. Dimitri Root, LEFT foot gangrene 4-5 toes, PVD [illegible]".  Daughter denies recent illness including fevers, cough, vomiting, diarrhea, change in urinary habits.  Unable to obtain ROS from patient given dementia.  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 Saw/spoke to patient. Patient has dementia. Daughters present to provide history. Patient with RLE ulcers being taken care of by Dr. Ferguson. Patient developed LLE gangrene of 4th and 5th digits, went to see Dr Linder today. Send to ED for further eval. Patient has not had fevers, chills, or any other new symptoms of sepsis (history per daughters). No cough/sob. No abd pain. No N/V/D. No dysuria, no pyuria. No recent purulent discharge from wounds. ID called for further eval.    PAST MEDICAL & SURGICAL HISTORY:  Afib  History of total mastectomy    Allergies    penicillin (Hives)--as adult but minimal history available  sulfa drugs (Other)    ANTIMICROBIALS:  vancomycin  IVPB 1000 once    OTHER MEDS:      SOCIAL HISTORY: No tobacco, no alcohol, no illicit drugs    FAMILY HISTORY: No pertinent family history relating to chief complaint    Drug Dosing Weight  Height (cm): 152.4 (21 Feb 2020 15:41)  Weight (kg): 55.8 (21 Feb 2020 15:41)  BMI (kg/m2): 24 (21 Feb 2020 15:41)  BSA (m2): 1.52 (21 Feb 2020 15:41)    PE:    Vital Signs Last 24 Hrs  T(C): 36.7 (21 Feb 2020 15:41), Max: 36.7 (21 Feb 2020 15:41)  T(F): 98 (21 Feb 2020 15:41), Max: 98 (21 Feb 2020 15:41)  HR: 88 (21 Feb 2020 15:41) (88 - 88)  BP: 126/84 (21 Feb 2020 15:41) (126/84 - 126/84)  RR: 18 (21 Feb 2020 15:41) (18 - 18)  SpO2: 96% (21 Feb 2020 15:41) (96% - 96%)    Gen: AOx3, NAD, non-toxic  CV: S1+S2 normal, nontachycardic  Resp: Clear bilat, no resp distress, no crackles/wheezes  Abd: Soft, nontender, +BS  Ext: LLE 4th and 5th digit gangrene, some surrounding redness. L sided ? chronic venous stasis ulcers appear clean without active purulence or erythema.  : No Crook, no suprapubic tenderness  IV/Skin: No thrombophlebitis, no rash  Msk: No low back pain, no arthralgias, no joint swelling  Neuro: No sensory deficits, no motor deficits    LABS:                        10.5   17.47 )-----------( 385      ( 21 Feb 2020 16:41 )             33.9     02-21    134<L>  |  96  |  20  ----------------------------<  95  4.2   |  26  |  0.91    Ca    9.4      21 Feb 2020 16:41    TPro  7.4  /  Alb  3.3  /  TBili  0.3  /  DBili  x   /  AST  34  /  ALT  13  /  AlkPhos  100  02-21    MICROBIOLOGY:    No new available    RADIOLOGY:    No new available

## 2020-02-21 NOTE — CONSULT NOTE ADULT - ASSESSMENT
89 F with PMH/PSH including AFib on Eliquis and Metoprolol, dementia, ?HF on furosemide, s/p double mastectomy for cystic breasts (1970s) presents to the ED sent in by podiatrist Dr. Linder and wound doctor Dr. Ferguson for gangrene of toes on the L foot.  Leukocytosis, no fever  LLE 4th and 5th digit gangrene  L lateral calf ulcers--clean chronic venous stasis ulcers  Leukocytosis reactive to gangrene > active infection?  Continue abx for now, monitor response, consider stopping shortly  Overall,  1) LE wound infection  - Vanco by level (monitor AM levels, redose when <15)  - Aztreonam 1g q 8  - Monitor Cr  - Monitor LE wounds for signs infection  - F/U podiatry  - F/U pending BCXs  2) Gangrene  - Follow up vascular eval, any surgical planning?  - Abx efficacy (if a full course is ultimately needed) dependent on vascular supply to LE    Victor Hugo Gentile MD  Pager [phone]  After 5pm and on weekends call [phone]

## 2020-02-21 NOTE — CONSULT NOTE ADULT - ASSESSMENT
89F with let foot 4th / 5th toe gangrene   -sent in for admission under Dr Guajardo   -XR, JERE / PVR ordered   -consult vasc - Dr Root   -rec ID consult - Dr Collins   -consult wound care for management of leg wounds   -continue local wound care with betadine / DSD to toes   -podiatry plan pending vascular tests / recs   -will follow

## 2020-02-21 NOTE — ED PROVIDER NOTE - CLINICAL SUMMARY MEDICAL DECISION MAKING FREE TEXT BOX
89 yo F wtih pmhx Afib(on elliquis), possible HF, htn, hld and dementia BIBA accompanied by daughter from wound center for Left foot gangrene and ulcer. Possible cellulitis. Will obtain ekg, labs, cultures, xray, and start IV antibiotics. Podiatry at bedside. Admit to [facility]

## 2020-02-21 NOTE — ED PROVIDER NOTE - CARE PLAN
Principal Discharge DX:	Gangrenous toe  Secondary Diagnosis:	Ulcer of left lower extremity with muscle involvement without evidence of necrosis

## 2020-02-21 NOTE — ED ADULT NURSE NOTE - OBJECTIVE STATEMENT
pt presents with daughter as sent in for admission for gangrene to left 4th and 5th toes. Pt with dementia- a/ox2 at present and denies any complaints. + assist needed to get to wheelchair per daughter"

## 2020-02-21 NOTE — ED ADULT NURSE NOTE - NURSING SKIN WOUND TYPE #1
left lateral clf with large open wound with yellow slough tissue- no drainage at present./venous stasis ulcer

## 2020-02-22 LAB
ANION GAP SERPL CALC-SCNC: 11 MMOL/L — SIGNIFICANT CHANGE UP (ref 5–17)
BUN SERPL-MCNC: 18 MG/DL — SIGNIFICANT CHANGE UP (ref 7–23)
CALCIUM SERPL-MCNC: 8.9 MG/DL — SIGNIFICANT CHANGE UP (ref 8.4–10.5)
CHLORIDE SERPL-SCNC: 101 MMOL/L — SIGNIFICANT CHANGE UP (ref 96–108)
CO2 SERPL-SCNC: 25 MMOL/L — SIGNIFICANT CHANGE UP (ref 22–31)
CREAT SERPL-MCNC: 0.9 MG/DL — SIGNIFICANT CHANGE UP (ref 0.5–1.3)
CRP SERPL-MCNC: 4.63 MG/DL — HIGH (ref 0–0.4)
GLUCOSE SERPL-MCNC: 80 MG/DL — SIGNIFICANT CHANGE UP (ref 70–99)
HCT VFR BLD CALC: 31.7 % — LOW (ref 34.5–45)
HGB BLD-MCNC: 10.1 G/DL — LOW (ref 11.5–15.5)
MCHC RBC-ENTMCNC: 29 PG — SIGNIFICANT CHANGE UP (ref 27–34)
MCHC RBC-ENTMCNC: 31.9 GM/DL — LOW (ref 32–36)
MCV RBC AUTO: 91.1 FL — SIGNIFICANT CHANGE UP (ref 80–100)
NRBC # BLD: 0 /100 WBCS — SIGNIFICANT CHANGE UP (ref 0–0)
PLATELET # BLD AUTO: 298 K/UL — SIGNIFICANT CHANGE UP (ref 150–400)
POTASSIUM SERPL-MCNC: 2.9 MMOL/L — CRITICAL LOW (ref 3.5–5.3)
POTASSIUM SERPL-SCNC: 2.9 MMOL/L — CRITICAL LOW (ref 3.5–5.3)
RBC # BLD: 3.48 M/UL — LOW (ref 3.8–5.2)
RBC # FLD: 16.4 % — HIGH (ref 10.3–14.5)
SODIUM SERPL-SCNC: 137 MMOL/L — SIGNIFICANT CHANGE UP (ref 135–145)
TSH SERPL-MCNC: 3.43 UIU/ML — SIGNIFICANT CHANGE UP (ref 0.27–4.2)
VANCOMYCIN FLD-MCNC: 11.9 UG/ML — SIGNIFICANT CHANGE UP
VIT B12 SERPL-MCNC: 837 PG/ML — SIGNIFICANT CHANGE UP (ref 232–1245)
WBC # BLD: 13.49 K/UL — HIGH (ref 3.8–10.5)
WBC # FLD AUTO: 13.49 K/UL — HIGH (ref 3.8–10.5)

## 2020-02-22 PROCEDURE — 99232 SBSQ HOSP IP/OBS MODERATE 35: CPT

## 2020-02-22 RX ORDER — METOPROLOL TARTRATE 50 MG
1 TABLET ORAL
Qty: 0 | Refills: 0 | DISCHARGE

## 2020-02-22 RX ORDER — FUROSEMIDE 40 MG
1 TABLET ORAL
Qty: 0 | Refills: 0 | DISCHARGE

## 2020-02-22 RX ORDER — VANCOMYCIN HCL 1 G
1000 VIAL (EA) INTRAVENOUS ONCE
Refills: 0 | Status: COMPLETED | OUTPATIENT
Start: 2020-02-22 | End: 2020-02-22

## 2020-02-22 RX ORDER — APIXABAN 2.5 MG/1
1 TABLET, FILM COATED ORAL
Qty: 0 | Refills: 0 | DISCHARGE

## 2020-02-22 RX ORDER — POTASSIUM CHLORIDE 20 MEQ
40 PACKET (EA) ORAL ONCE
Refills: 0 | Status: COMPLETED | OUTPATIENT
Start: 2020-02-22 | End: 2020-02-22

## 2020-02-22 RX ORDER — PENTOXIFYLLINE 400 MG
1 TABLET, EXTENDED RELEASE ORAL
Qty: 0 | Refills: 0 | DISCHARGE

## 2020-02-22 RX ADMIN — Medication 50 MILLIGRAM(S): at 06:20

## 2020-02-22 RX ADMIN — Medication 50 MILLIGRAM(S): at 01:43

## 2020-02-22 RX ADMIN — Medication 50 MILLIGRAM(S): at 18:55

## 2020-02-22 RX ADMIN — Medication 400 MILLIGRAM(S): at 06:20

## 2020-02-22 RX ADMIN — APIXABAN 2.5 MILLIGRAM(S): 2.5 TABLET, FILM COATED ORAL at 11:12

## 2020-02-22 RX ADMIN — Medication 40 MILLIGRAM(S): at 06:20

## 2020-02-22 RX ADMIN — APIXABAN 2.5 MILLIGRAM(S): 2.5 TABLET, FILM COATED ORAL at 21:21

## 2020-02-22 RX ADMIN — Medication 50 MILLIGRAM(S): at 11:01

## 2020-02-22 RX ADMIN — Medication 400 MILLIGRAM(S): at 18:55

## 2020-02-22 RX ADMIN — Medication 40 MILLIEQUIVALENT(S): at 11:12

## 2020-02-22 RX ADMIN — Medication 250 MILLIGRAM(S): at 16:13

## 2020-02-22 NOTE — PROGRESS NOTE ADULT - SUBJECTIVE AND OBJECTIVE BOX
Podiatry pager #: [phone] ([place])/ [phone] ([facility])    Patient is a 89y old  Female who presents with a chief complaint of leg gangrene/ ulcers (21 Feb 2020 17:42)       INTERVAL HPI/OVERNIGHT EVENTS:  Patient seen and evaluated at bedside.  Pt is resting comfortable in NAD. Denies N/V/F/C.     Allergies    penicillin (Hives)  sulfa drugs (Other)    Intolerances        Vital Signs Last 24 Hrs  T(C): 36.2 (22 Feb 2020 10:50), Max: 36.9 (21 Feb 2020 19:39)  T(F): 97.2 (22 Feb 2020 10:50), Max: 98.4 (21 Feb 2020 19:39)  HR: 85 (22 Feb 2020 10:50) (78 - 96)  BP: 118/67 (22 Feb 2020 10:50) (118/67 - 159/66)  BP(mean): --  RR: 18 (22 Feb 2020 10:50) (18 - 20)  SpO2: 98% (22 Feb 2020 10:50) (96% - 100%)    LABS:                        10.1   13.49 )-----------( 298      ( 22 Feb 2020 07:06 )             31.7     02-22    137  |  101  |  18  ----------------------------<  80  2.9<LL>   |  25  |  0.90    Ca    8.9      22 Feb 2020 07:06    TPro  7.4  /  Alb  3.3  /  TBili  0.3  /  DBili  x   /  AST  34  /  ALT  13  /  AlkPhos  100  02-21    PT/INR - ( 21 Feb 2020 16:41 )   PT: 14.0 sec;   INR: 1.22 ratio         PTT - ( 21 Feb 2020 16:41 )  PTT:30.4 sec    CAPILLARY BLOOD GLUCOSE          Lower Extremity Physical Exam:  Vascular: DP/PT 0/4, B/L, CFT absent Left foot 4th + 5th toes,  Temperature gradient warm to cool B/L.   Neuro: Epicritic sensation diminished to the level of toes, B/L.  Musculoskeletal/Ortho: no gross deformities  Skin: dry gangrene to left foot 4th and 5th toes, 2 large ischemic leg wounds with fibrogranular base - no acute signs of infection

## 2020-02-22 NOTE — PHYSICAL THERAPY INITIAL EVALUATION ADULT - GAIT DEVIATIONS NOTED, PT EVAL
decreased velocity of limb motion/decreased sarah/decreased weight-shifting ability/decreased step length

## 2020-02-22 NOTE — CHART NOTE - NSCHARTNOTEFT_GEN_A_CORE
Informed by RN of critical value in am as follow:    02-22    137  |  101  |  18  ----------------------------<  80  2.9<LL>   |  25  |  0.90    Ca    8.9      22 Feb 2020 07:06    Asymptomatic hypokalemia; K+ replenished. f/u am labs      JAYNA Angel   [phone]

## 2020-02-22 NOTE — PHYSICAL THERAPY INITIAL EVALUATION ADULT - PRECAUTIONS/LIMITATIONS, REHAB EVAL
Reports the toe wounds are from beginning of January.  Reports she had a scrape on the distal lower leg, ?from a transfer, initially went to an ED because could not control bleeding. On 1/30/20 PMD reported wounds malodorous and was referred to wound center but toes already turning black.

## 2020-02-22 NOTE — PROGRESS NOTE ADULT - SUBJECTIVE AND OBJECTIVE BOX
CC: F/U for Infected wound    Saw/spoke to patient. Patient demented, but appears unchanged to improved. No complaints.    Allergies  penicillin (Hives)  sulfa drugs (Other)    ANTIMICROBIALS:  aztreonam  IVPB 1000 every 8 hours    PE:    Vital Signs Last 24 Hrs  T(C): 36.6 (22 Feb 2020 06:02), Max: 36.9 (21 Feb 2020 19:39)  T(F): 97.9 (22 Feb 2020 06:02), Max: 98.4 (21 Feb 2020 19:39)  HR: 96 (22 Feb 2020 06:02) (78 - 96)  BP: 137/71 (22 Feb 2020 06:02) (126/84 - 159/66)  RR: 18 (22 Feb 2020 06:02) (18 - 20)  SpO2: 98% (22 Feb 2020 06:02) (96% - 100%)    Gen: AOx3, NAD, non-toxic, pleasant  CV: S1+S2 normal, nontachycardic  Resp: Clear bilat, no resp distress, no crackles/wheezes  Abd: Soft, nontender, +BS  Ext: LLE 4th and 5th toe gangrene, shin ulcers with sloughing discharge--equivocal exam for infection (no obvious purulence, or cellulitis)    LABS:                        10.1   13.49 )-----------( 298      ( 22 Feb 2020 07:06 )             31.7     02-22    137  |  101  |  18  ----------------------------<  80  2.9<LL>   |  25  |  0.90    Ca    8.9      22 Feb 2020 07:06    TPro  7.4  /  Alb  3.3  /  TBili  0.3  /  DBili  x   /  AST  34  /  ALT  13  /  AlkPhos  100  02-21    MICROBIOLOGY:  Vancomycin Level, Random: 11.9 ug/mL (02-22-20 @ 07:06)    RADIOLOGY:    No new available

## 2020-02-22 NOTE — PROGRESS NOTE ADULT - SUBJECTIVE AND OBJECTIVE BOX
Patient is a 89y old  Female who presents with a chief complaint of leg gangrene/ ulcers (22 Feb 2020 12:54)      SUBJECTIVE / OVERNIGHT EVENTS: Comfortable without new complaints. Family at bedside.   Review of Systems  chest pain no  palpitations no  sob no  nausea no  headache no    MEDICATIONS  (STANDING):  apixaban 2.5 milliGRAM(s) Oral every 12 hours  aztreonam  IVPB 1000 milliGRAM(s) IV Intermittent every 8 hours  furosemide    Tablet 40 milliGRAM(s) Oral daily  metoprolol succinate ER 50 milliGRAM(s) Oral daily  pentoxifylline 400 milliGRAM(s) Oral two times a day    MEDICATIONS  (PRN):  acetaminophen   Tablet .. 650 milliGRAM(s) Oral every 6 hours PRN Temp greater or equal to 38.5C (101.3F), Mild Pain (1 - 3)      Vital Signs Last 24 Hrs  T(C): 36.2 (22 Feb 2020 10:50), Max: 36.9 (21 Feb 2020 19:39)  T(F): 97.2 (22 Feb 2020 10:50), Max: 98.4 (21 Feb 2020 19:39)  HR: 85 (22 Feb 2020 10:50) (81 - 96)  BP: 118/67 (22 Feb 2020 10:50) (118/67 - 159/66)  BP(mean): --  RR: 18 (22 Feb 2020 10:50) (18 - 20)  SpO2: 98% (22 Feb 2020 10:50) (98% - 100%)    PHYSICAL EXAM:  GENERAL: NAD, well-developed  HEAD:  Atraumatic, Normocephalic  EYES: EOMI, PERRLA, conjunctiva and sclera clear  NECK: Supple, No JVD  CHEST/LUNG: Clear to auscultation bilaterally; No wheeze  HEART: Regular rate and rhythm; No murmurs, rubs, or gallops  ABDOMEN: Soft, Nontender, Nondistended; Bowel sounds present  EXTREMITIES:  L leg with clean dressing. 4 and 5th toes gangrenous.  PSYCH: AAOx3  NEUROLOGY: non-focal  SKIN: No rashes or lesions    LABS:                        10.1   13.49 )-----------( 298      ( 22 Feb 2020 07:06 )             31.7     02-22    137  |  101  |  18  ----------------------------<  80  2.9<LL>   |  25  |  0.90    Ca    8.9      22 Feb 2020 07:06    TPro  7.4  /  Alb  3.3  /  TBili  0.3  /  DBili  x   /  AST  34  /  ALT  13  /  AlkPhos  100  02-21    PT/INR - ( 21 Feb 2020 16:41 )   PT: 14.0 sec;   INR: 1.22 ratio         PTT - ( 21 Feb 2020 16:41 )  PTT:30.4 sec            RADIOLOGY & ADDITIONAL TESTS:    Imaging Personally Reviewed:    Consultant(s) Notes Reviewed:      Care Discussed with Consultants/Other Providers:

## 2020-02-22 NOTE — PHYSICAL THERAPY INITIAL EVALUATION ADULT - PERTINENT HX OF CURRENT PROBLEM, REHAB EVAL
Pt being d/c'd home with three new prescriptions.      Miko Garcia RN  03/08/18 1425     Pt is a 89 y/o female admitted to [facility] on 2/21/20   PMH/PSH including AFib on Eliquis and Metoprolol, dementia, ?HF on furosemide, s/p double mastectomy for cystic breasts (1970s) presents to the ED sent in by podiatrist Dr. Linder and wound doctor Dr. Ferguson for gangrene of toes on the L foot.  As per dtr, Reports ankle wound is about 2.5 yrs old, reports the wounds on the distal lower leg are from 11/21/19.

## 2020-02-22 NOTE — PROGRESS NOTE ADULT - ASSESSMENT
89F with left foot 4th / 5th toe gangrene   -Pt seen and evaluated   -Left foot 4th & 5th digit gangrene- dry and stable, no acute signs of infection  -Awaiting JERE/PVR and vascular work  up  -Will plan for possible 4th & 5th digit amputations pending [facility] recs/revasc  -Abx per ID, appreciate recs  -Wound care team to manage leg wounds   -Continue local wound care with betadine and dry sterile dressing to the toes   -Discussed w/ attending

## 2020-02-22 NOTE — PROGRESS NOTE ADULT - ASSESSMENT
89 f with    Foot ulceration/ Gangrene  - wound care  - Vascular evaluation pending   - JERE/ PVR  - Podiatry follow. May need toes amputation   - antibiotics  - ID follow    Afib   - rate control  - continue AC    Dementia   - supportive care     HTN (hypertension)   -  control    PVD (peripheral vascular disease)  - Vascular evaluation    Hypokalemia  - supplement    d/w family at length     John Guajardo MD pager [phone]

## 2020-02-22 NOTE — PHYSICAL THERAPY INITIAL EVALUATION ADULT - IMPAIRMENTS CONTRIBUTING IMPAIRED BED MOBILITY, REHAB EVAL
Received refill request for Sotalol 80mg 1/2 tab every 12 hours.   Does not appear patient follows in our clinic.   Last visit with Dr. Boone was a consult in hospital January of 2017.   Will route to PCP to advise on refill.   impaired balance/decreased strength/impaired postural control

## 2020-02-22 NOTE — PROGRESS NOTE ADULT - ASSESSMENT
89 F with PMH/PSH including AFib on Eliquis and Metoprolol, dementia, ?HF on furosemide, s/p double mastectomy for cystic breasts (1970s) presents to the ED sent in by podiatrist Dr. Linder and wound doctor Dr. Ferguson for gangrene of toes on the L foot.  Leukocytosis, no fever  LLE 4th and 5th digit gangrene  L lateral calf ulcers--clean chronic venous stasis ulcers  Leukocytosis reactive to gangrene > active infection?  WBC improved--implies response to abx vs hydration or other therapy? Complete brief course for possible wound infection  Overall,  1) LE wound infection  - Vanco by level (monitor AM levels, redose when <15)--I ordered 1g today, 2/22  - Aztreonam 1g q 8  - Monitor Cr  - Monitor LE wounds for signs infection  - F/U podiatry  - F/U pending BCXs  2) Gangrene  - Follow up vascular eval, any surgical planning?  - Abx efficacy (if a full course is ultimately needed) dependent on vascular supply to LE    Victor Hugo Gentile MD  Pager [phone]  After 5pm and on weekends call [phone]

## 2020-02-23 LAB
ANION GAP SERPL CALC-SCNC: 11 MMOL/L — SIGNIFICANT CHANGE UP (ref 5–17)
BUN SERPL-MCNC: 20 MG/DL — SIGNIFICANT CHANGE UP (ref 7–23)
CALCIUM SERPL-MCNC: 9.2 MG/DL — SIGNIFICANT CHANGE UP (ref 8.4–10.5)
CHLORIDE SERPL-SCNC: 102 MMOL/L — SIGNIFICANT CHANGE UP (ref 96–108)
CO2 SERPL-SCNC: 24 MMOL/L — SIGNIFICANT CHANGE UP (ref 22–31)
CREAT SERPL-MCNC: 0.89 MG/DL — SIGNIFICANT CHANGE UP (ref 0.5–1.3)
GLUCOSE SERPL-MCNC: 89 MG/DL — SIGNIFICANT CHANGE UP (ref 70–99)
HCT VFR BLD CALC: 29.9 % — LOW (ref 34.5–45)
HGB BLD-MCNC: 9.2 G/DL — LOW (ref 11.5–15.5)
MCHC RBC-ENTMCNC: 28.9 PG — SIGNIFICANT CHANGE UP (ref 27–34)
MCHC RBC-ENTMCNC: 30.8 GM/DL — LOW (ref 32–36)
MCV RBC AUTO: 94 FL — SIGNIFICANT CHANGE UP (ref 80–100)
NRBC # BLD: 0 /100 WBCS — SIGNIFICANT CHANGE UP (ref 0–0)
PLATELET # BLD AUTO: 285 K/UL — SIGNIFICANT CHANGE UP (ref 150–400)
POTASSIUM SERPL-MCNC: 3.9 MMOL/L — SIGNIFICANT CHANGE UP (ref 3.5–5.3)
POTASSIUM SERPL-SCNC: 3.9 MMOL/L — SIGNIFICANT CHANGE UP (ref 3.5–5.3)
RBC # BLD: 3.18 M/UL — LOW (ref 3.8–5.2)
RBC # FLD: 16.5 % — HIGH (ref 10.3–14.5)
SODIUM SERPL-SCNC: 137 MMOL/L — SIGNIFICANT CHANGE UP (ref 135–145)
VANCOMYCIN FLD-MCNC: 16 UG/ML — SIGNIFICANT CHANGE UP
WBC # BLD: 13.84 K/UL — HIGH (ref 3.8–10.5)
WBC # FLD AUTO: 13.84 K/UL — HIGH (ref 3.8–10.5)

## 2020-02-23 PROCEDURE — 99221 1ST HOSP IP/OBS SF/LOW 40: CPT

## 2020-02-23 RX ADMIN — Medication 50 MILLIGRAM(S): at 10:19

## 2020-02-23 RX ADMIN — Medication 400 MILLIGRAM(S): at 17:17

## 2020-02-23 RX ADMIN — Medication 50 MILLIGRAM(S): at 05:23

## 2020-02-23 RX ADMIN — Medication 50 MILLIGRAM(S): at 01:25

## 2020-02-23 RX ADMIN — Medication 40 MILLIGRAM(S): at 05:23

## 2020-02-23 RX ADMIN — Medication 50 MILLIGRAM(S): at 17:16

## 2020-02-23 RX ADMIN — APIXABAN 2.5 MILLIGRAM(S): 2.5 TABLET, FILM COATED ORAL at 10:19

## 2020-02-23 RX ADMIN — Medication 400 MILLIGRAM(S): at 05:23

## 2020-02-23 RX ADMIN — APIXABAN 2.5 MILLIGRAM(S): 2.5 TABLET, FILM COATED ORAL at 21:02

## 2020-02-23 NOTE — CONSULT NOTE ADULT - PROBLEM SELECTOR RECOMMENDATION 2
I Dimitri Root MD performed a history and physical exam of the patient and discussed  the findings and plan with the house officer. I reviewed the resident note and agree with the findings and plan

## 2020-02-23 NOTE — PROGRESS NOTE ADULT - SUBJECTIVE AND OBJECTIVE BOX
Patient is a 89y old  Female who presents with a chief complaint of leg gangrene/ ulcers (23 Feb 2020 01:30)      SUBJECTIVE / OVERNIGHT EVENTS: No new complaints. Family at bedside.   Review of Systems  chest pain no  palpitations no  sob no  nausea no  headache no    MEDICATIONS  (STANDING):  apixaban 2.5 milliGRAM(s) Oral every 12 hours  aztreonam  IVPB 1000 milliGRAM(s) IV Intermittent every 8 hours  furosemide    Tablet 40 milliGRAM(s) Oral daily  metoprolol succinate ER 50 milliGRAM(s) Oral daily  pentoxifylline 400 milliGRAM(s) Oral two times a day    MEDICATIONS  (PRN):  acetaminophen   Tablet .. 650 milliGRAM(s) Oral every 6 hours PRN Temp greater or equal to 38.5C (101.3F), Mild Pain (1 - 3)      Vital Signs Last 24 Hrs  T(C): 36.2 (23 Feb 2020 12:17), Max: 36.9 (22 Feb 2020 20:01)  T(F): 97.2 (23 Feb 2020 12:17), Max: 98.4 (22 Feb 2020 20:01)  HR: 93 (23 Feb 2020 12:17) (73 - 100)  BP: 155/79 (23 Feb 2020 12:17) (121/72 - 156/78)  BP(mean): --  RR: 18 (23 Feb 2020 12:17) (18 - 18)  SpO2: 96% (23 Feb 2020 12:17) (94% - 97%)    PHYSICAL EXAM:  GENERAL: NAD  HEAD:  Atraumatic, Normocephalic  EYES: EOMI, PERRLA, conjunctiva and sclera clear  NECK: Supple, No JVD  CHEST/LUNG: Clear to auscultation bilaterally; No wheeze  HEART: Regular rate and rhythm; No murmurs, rubs, or gallops  ABDOMEN: Soft, Nontender, Nondistended; Bowel sounds present  EXTREMITIES:  L leg with clean dressing.  PSYCH: confused  NEUROLOGY: non-focal  SKIN: No rashes or lesions    LABS:                        9.2    13.84 )-----------( 285      ( 23 Feb 2020 07:00 )             29.9     02-23    137  |  102  |  20  ----------------------------<  89  3.9   |  24  |  0.89    Ca    9.2      23 Feb 2020 07:00    TPro  7.4  /  Alb  3.3  /  TBili  0.3  /  DBili  x   /  AST  34  /  ALT  13  /  AlkPhos  100  02-21    PT/INR - ( 21 Feb 2020 16:41 )   PT: 14.0 sec;   INR: 1.22 ratio         PTT - ( 21 Feb 2020 16:41 )  PTT:30.4 sec          Culture - Blood (collected 21 Feb 2020 22:28)  Source: .Blood Blood-Venous  Preliminary Report (22 Feb 2020 23:02):    No growth to date.    Culture - Blood (collected 21 Feb 2020 22:27)  Source: .Blood Blood-Venous  Preliminary Report (22 Feb 2020 23:02):    No growth to date.        RADIOLOGY & ADDITIONAL TESTS:    Imaging Personally Reviewed:    Consultant(s) Notes Reviewed:      Care Discussed with Consultants/Other Providers:

## 2020-02-23 NOTE — CONSULT NOTE ADULT - SUBJECTIVE AND OBJECTIVE BOX
[facility] Vascular Surgical Consultant Evaluation    HPI:  89F with PMH/PSH including AFib on Eliquis and Metoprolol, dementia, ?HF on furosemide, s/p double mastectomy for cystic breasts (1970s) presents to the ED sent in by podiatrist Dr. Linder and wound doctor Dr. Ferguson for gangrene of toes on the L foot.  Daughter reports that she has been following with wound care, Dr. Ferguson, and recently completed a course of Flagyl and Ciprofloxacin.  Reports was started on Pentoxifylline last week for attempt at improved blood flow.  Reports ankle wound is about 2.5 yrs old, reports the wounds on the distal lower leg are from 11/21/19.  Reports the toe wounds are from beginning of January.  Reports she had a scrape on the distal lower leg, ?from a transfer, initially went to an ED because could not control bleeding.  Reports then followed with PMD for both the toes and the distal lower leg until 2/6/20 when she began seeing wound care.  Reports on 1/30/20 PMD reported wounds malodorous and was referred to wound center but toes already turning black.  Reports on 2/6/20 Dr. Ferguson Rx'ed Flagy/Cipro for her toes.  Reports completed course of Flagyl and Cipro.  Reports today was her third visit with wound care and was sent in by Mando Linder and Phyllis .  Daughter denies recent illness including fevers, cough, vomiting, diarrhea, change in urinary habits.  Unable to obtain ROS from patient given dementia.  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 JERE/PVRs pending.       PAST MEDICAL & SURGICAL HISTORY:  PVD (peripheral vascular disease)  HTN (hypertension)  Dementia  Afib  History of total mastectomy      MEDICATIONS  (STANDING):  apixaban 2.5 milliGRAM(s) Oral every 12 hours  aztreonam  IVPB 1000 milliGRAM(s) IV Intermittent every 8 hours  furosemide    Tablet 40 milliGRAM(s) Oral daily  metoprolol succinate ER 50 milliGRAM(s) Oral daily  pentoxifylline 400 milliGRAM(s) Oral two times a day      ___________________________________________  REVIEW OF SYSTEMS:    ___________________________________________  PHYSICAL EXAM:  Vital Signs Last 24 Hrs  T(C): 36.9 (22 Feb 2020 20:01), Max: 36.9 (22 Feb 2020 20:01)  T(F): 98.4 (22 Feb 2020 20:01), Max: 98.4 (22 Feb 2020 20:01)  HR: 73 (22 Feb 2020 20:01) (73 - 96)  BP: 121/72 (22 Feb 2020 20:01) (118/67 - 137/71)  BP(mean): --  RR: 18 (22 Feb 2020 20:01) (18 - 18)  SpO2: 97% (22 Feb 2020 20:01) (97% - 98%)CAPILLARY BLOOD GLUCOSE        I&O's Detail    21 Feb 2020 07:01  -  22 Feb 2020 07:00  --------------------------------------------------------  IN:    Oral Fluid: 240 mL  Total IN: 240 mL    OUT:  Total OUT: 0 mL    Total NET: 240 mL      22 Feb 2020 07:01  -  23 Feb 2020 01:31  --------------------------------------------------------  IN:    Oral Fluid: 120 mL  Total IN: 120 mL    OUT:  Total OUT: 0 mL    Total NET: 120 mL          General: Alert and Oriented x3, No acute distress.  Respiratory: Breathing non-labored.  Abdomen: Soft, nontender, nondistended. No rebound, no guarding, No palpable organomegaly or masses.  Extremities: Moves all four.     ____________________________________________  LABS:  CBC Full  -  ( 22 Feb 2020 07:06 )  WBC Count : 13.49 K/uL  RBC Count : 3.48 M/uL  Hemoglobin : 10.1 g/dL  Hematocrit : 31.7 %  Platelet Count - Automated : 298 K/uL  Mean Cell Volume : 91.1 fl  Mean Cell Hemoglobin : 29.0 pg  Mean Cell Hemoglobin Concentration : 31.9 gm/dL  Auto Neutrophil # : x  Auto Lymphocyte # : x  Auto Monocyte # : x  Auto Eosinophil # : x  Auto Basophil # : x  Auto Neutrophil % : x  Auto Lymphocyte % : x  Auto Monocyte % : x  Auto Eosinophil % : x  Auto Basophil % : x    02-22    137  |  101  |  18  ----------------------------<  80  2.9<LL>   |  25  |  0.90    Ca    8.9      22 Feb 2020 07:06    TPro  7.4  /  Alb  3.3  /  TBili  0.3  /  DBili  x   /  AST  34  /  ALT  13  /  AlkPhos  100  02-21    LIVER FUNCTIONS - ( 21 Feb 2020 16:41 )  Alb: 3.3 g/dL / Pro: 7.4 g/dL / ALK PHOS: 100 U/L / ALT: 13 U/L / AST: 34 U/L / GGT: x           PT/INR - ( 21 Feb 2020 16:41 )   PT: 14.0 sec;   INR: 1.22 ratio         PTT - ( 21 Feb 2020 16:41 )  PTT:30.4 sec        ____________________________________________  RADIOLOGY: [facility] Vascular Surgical Consultant Evaluation    HPI:  89F with PMH/PSH including AFib on Eliquis and Metoprolol, dementia, ?HF on furosemide, s/p double mastectomy for cystic breasts (1970s) presents to the ED sent in by podiatrist Dr. Linder and wound doctor Dr. Ferguson for gangrene of toes on the L foot.  Daughter reports that she has been following with wound care, Dr. Ferguson, and recently completed a course of Flagyl and Ciprofloxacin.  Reports was started on Pentoxifylline last week for attempt at improved blood flow.  Reports ankle wound is about 2.5 yrs old, reports the wounds on the distal lower leg are from 11/21/19.  Reports the toe wounds are from beginning of January.  Reports she had a scrape on the distal lower leg, ?from a transfer, initially went to an ED because could not control bleeding.  Reports then followed with PMD for both the toes and the distal lower leg until 2/6/20 when she began seeing wound care.  Reports on 1/30/20 PMD reported wounds malodorous and was referred to wound center but toes already turning black.  Reports on 2/6/20 Dr. Ferguson Rx'ed Flagy/Cipro for her toes.  Reports completed course of Flagyl and Cipro.  Reports today was her third visit with wound care and was sent in by Mando Linder and Phyllis .  Daughter denies recent illness including fevers, cough, vomiting, diarrhea, change in urinary habits.  Unable to obtain ROS from patient given dementia.  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 On evaluation, signal discrepancy present between L and R. L 4-5th toe dry gangrene, lateral malleous, and venous stasis ulcers noted. Motor/sensation intact. JERE/PVRs pending.       PAST MEDICAL & SURGICAL HISTORY:  PVD (peripheral vascular disease)  HTN (hypertension)  Dementia  Afib  History of total mastectomy      MEDICATIONS  (STANDING):  apixaban 2.5 milliGRAM(s) Oral every 12 hours  aztreonam  IVPB 1000 milliGRAM(s) IV Intermittent every 8 hours  furosemide    Tablet 40 milliGRAM(s) Oral daily  metoprolol succinate ER 50 milliGRAM(s) Oral daily  pentoxifylline 400 milliGRAM(s) Oral two times a day      ___________________________________________  REVIEW OF SYSTEMS:  As stated in History of Present Illness, otherwise non-contributory.   ___________________________________________  PHYSICAL EXAM:  Vital Signs Last 24 Hrs  T(C): 36.9 (22 Feb 2020 20:01), Max: 36.9 (22 Feb 2020 20:01)  T(F): 98.4 (22 Feb 2020 20:01), Max: 98.4 (22 Feb 2020 20:01)  HR: 73 (22 Feb 2020 20:01) (73 - 96)  BP: 121/72 (22 Feb 2020 20:01) (118/67 - 137/71)  BP(mean): --  RR: 18 (22 Feb 2020 20:01) (18 - 18)  SpO2: 97% (22 Feb 2020 20:01) (97% - 98%)CAPILLARY BLOOD GLUCOSE        I&O's Detail    21 Feb 2020 07:01  -  22 Feb 2020 07:00  --------------------------------------------------------  IN:    Oral Fluid: 240 mL  Total IN: 240 mL    OUT:  Total OUT: 0 mL    Total NET: 240 mL      22 Feb 2020 07:01  -  23 Feb 2020 01:31  --------------------------------------------------------  IN:    Oral Fluid: 120 mL  Total IN: 120 mL    OUT:  Total OUT: 0 mL    Total NET: 120 mL          General: Alert and Oriented x3, No acute distress.  Respiratory: Breathing non-labored.  Abdomen: Soft, nontender, nondistended. No rebound, no guarding, No palpable organomegaly or masses.  Vascular: R DP/PT signals, no L DP/PT signal, Bilateral popliteal signals and bilateral femoral pulses present.   Extremities: L 4-5th toe gangrene present, with lateral malleolous and venous stasis ulcers present. Sensation and motor intact in rest of foot.     ____________________________________________  LABS:  CBC Full  -  ( 22 Feb 2020 07:06 )  WBC Count : 13.49 K/uL  RBC Count : 3.48 M/uL  Hemoglobin : 10.1 g/dL  Hematocrit : 31.7 %  Platelet Count - Automated : 298 K/uL  Mean Cell Volume : 91.1 fl  Mean Cell Hemoglobin : 29.0 pg  Mean Cell Hemoglobin Concentration : 31.9 gm/dL  Auto Neutrophil # : x  Auto Lymphocyte # : x  Auto Monocyte # : x  Auto Eosinophil # : x  Auto Basophil # : x  Auto Neutrophil % : x  Auto Lymphocyte % : x  Auto Monocyte % : x  Auto Eosinophil % : x  Auto Basophil % : x    02-22    137  |  101  |  18  ----------------------------<  80  2.9<LL>   |  25  |  0.90    Ca    8.9      22 Feb 2020 07:06    TPro  7.4  /  Alb  3.3  /  TBili  0.3  /  DBili  x   /  AST  34  /  ALT  13  /  AlkPhos  100  02-21    LIVER FUNCTIONS - ( 21 Feb 2020 16:41 )  Alb: 3.3 g/dL / Pro: 7.4 g/dL / ALK PHOS: 100 U/L / ALT: 13 U/L / AST: 34 U/L / GGT: x           PT/INR - ( 21 Feb 2020 16:41 )   PT: 14.0 sec;   INR: 1.22 ratio         PTT - ( 21 Feb 2020 16:41 )  PTT:30.4 sec [facility] Vascular Surgical Consultant Evaluation    HPI:  89F with PMH/PSH including AFib on Eliquis and Metoprolol, dementia, ?HF on furosemide, s/p double mastectomy for cystic breasts (1970s) presents to the ED sent in by podiatrist Dr. Linder and wound doctor Dr. Ferguson for gangrene of toes on the L foot.  Daughter reports that she has been following with wound care, Dr. Ferguson, and recently completed a course of Flagyl and Ciprofloxacin.  Reports was started on Pentoxifylline last week for attempt at improved blood flow.  Reports ankle wound is about 2.5 yrs old, reports the wounds on the distal lower leg are from 11/21/19.  Reports the toe wounds are from beginning of January.  Reports she had a scrape on the distal lower leg, ?from a transfer, initially went to an ED because could not control bleeding.  Reports then followed with PMD for both the toes and the distal lower leg until 2/6/20 when she began seeing wound care.  Reports on 1/30/20 PMD reported wounds malodorous and was referred to wound center but toes already turning black.  Reports on 2/6/20 Dr. Ferguson Rx'ed Flagy/Cipro for her toes.  Reports completed course of Flagyl and Cipro.  Reports today was her third visit with wound care and was sent in by Mando Linder and Phyllis .  Daughter denies recent illness including fevers, cough, vomiting, diarrhea, change in urinary habits.  Unable to obtain ROS from patient given dementia.  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 On evaluation, signal discrepancy present between L and R. L 4-5th toe dry gangrene, lateral malleous, and venous stasis ulcers noted. Motor/sensation intact. JERE/PVRs pending.       PAST MEDICAL & SURGICAL HISTORY:  PVD (peripheral vascular disease)  HTN (hypertension)  Dementia  Afib  History of total mastectomy      MEDICATIONS  (STANDING):  apixaban 2.5 milliGRAM(s) Oral every 12 hours  aztreonam  IVPB 1000 milliGRAM(s) IV Intermittent every 8 hours  furosemide    Tablet 40 milliGRAM(s) Oral daily  metoprolol succinate ER 50 milliGRAM(s) Oral daily  pentoxifylline 400 milliGRAM(s) Oral two times a day      ___________________________________________  REVIEW OF SYSTEMS:  As stated in History of Present Illness, otherwise non-contributory.   ___________________________________________  PHYSICAL EXAM:  Vital Signs Last 24 Hrs  T(C): 36.9 (22 Feb 2020 20:01), Max: 36.9 (22 Feb 2020 20:01)  T(F): 98.4 (22 Feb 2020 20:01), Max: 98.4 (22 Feb 2020 20:01)  HR: 73 (22 Feb 2020 20:01) (73 - 96)  BP: 121/72 (22 Feb 2020 20:01) (118/67 - 137/71)  BP(mean): --  RR: 18 (22 Feb 2020 20:01) (18 - 18)  SpO2: 97% (22 Feb 2020 20:01) (97% - 98%)CAPILLARY BLOOD GLUCOSE        I&O's Detail    21 Feb 2020 07:01  -  22 Feb 2020 07:00  --------------------------------------------------------  IN:    Oral Fluid: 240 mL  Total IN: 240 mL    OUT:  Total OUT: 0 mL    Total NET: 240 mL      22 Feb 2020 07:01  -  23 Feb 2020 01:31  --------------------------------------------------------  IN:    Oral Fluid: 120 mL  Total IN: 120 mL    OUT:  Total OUT: 0 mL    Total NET: 120 mL    General: Alert and Oriented x3, No acute distress.  Respiratory: Breathing non-labored.  Abdomen: Soft, nontender, nondistended. No rebound, no guarding, No palpable organomegaly or masses.  Vascular: R DP/PT signals, no L DP/PT signal, Bilateral popliteal signals and bilateral femoral pulses present.   Extremities: L 4-5th toe gangrene present, with lateral malleolous and venous stasis ulcers present. Sensation and motor intact in rest of foot.     ____________________________________________  LABS:  CBC Full  -  ( 22 Feb 2020 07:06 )  WBC Count : 13.49 K/uL  RBC Count : 3.48 M/uL  Hemoglobin : 10.1 g/dL  Hematocrit : 31.7 %  Platelet Count - Automated : 298 K/uL  Mean Cell Volume : 91.1 fl  Mean Cell Hemoglobin : 29.0 pg  Mean Cell Hemoglobin Concentration : 31.9 gm/dL  Auto Neutrophil # : x  Auto Lymphocyte # : x  Auto Monocyte # : x  Auto Eosinophil # : x  Auto Basophil # : x  Auto Neutrophil % : x  Auto Lymphocyte % : x  Auto Monocyte % : x  Auto Eosinophil % : x  Auto Basophil % : x    02-22    137  |  101  |  18  ----------------------------<  80  2.9<LL>   |  25  |  0.90    Ca    8.9      22 Feb 2020 07:06    TPro  7.4  /  Alb  3.3  /  TBili  0.3  /  DBili  x   /  AST  34  /  ALT  13  /  AlkPhos  100  02-21    LIVER FUNCTIONS - ( 21 Feb 2020 16:41 )  Alb: 3.3 g/dL / Pro: 7.4 g/dL / ALK PHOS: 100 U/L / ALT: 13 U/L / AST: 34 U/L / GGT: x           PT/INR - ( 21 Feb 2020 16:41 )   PT: 14.0 sec;   INR: 1.22 ratio         PTT - ( 21 Feb 2020 16:41 )  PTT:30.4 sec

## 2020-02-23 NOTE — CONSULT NOTE ADULT - VASCULAR DETAILS
mod art insuff w mod to severe trophic skin changes   left toe 4 and 5 tips w  severe skin changes and  gangrene changes

## 2020-02-23 NOTE — PROGRESS NOTE ADULT - ASSESSMENT
89 f with    Foot ulceration/ Gangrene  - wound care  - Vascular evaluation noted   - JERE/ PVR pending   - Podiatry follow. May need toes amputation   - antibiotics  - ID follow    Afib   - rate control  - continue AC    Dementia   - supportive care     HTN (hypertension)   -  control    PVD (peripheral vascular disease)  - Vascular evaluation noted    Hypokalemia  - supplement    d/w family at length     John Guajardo MD pager [phone]

## 2020-02-23 NOTE — CONSULT NOTE ADULT - ASSESSMENT
89 F with PMH/PSH including AFib on Eliquis and Metoprolol, dementia, questioned HF on furosemide, s/p double mastectomy for cystic breasts (1970s) presents to the ED sent in by podiatrist Dr. Linder and wound doctor Dr. Ferguson for gangrene of toes on the L foot.    - No Acute Vascular Surgical intervention indicated at this time.   - F/u JERE/PVRs ordered.   - Dr. Root to be made aware.     Vascular Surgery Pager #[phone] 89 F with PMH/PSH including AFib on Eliquis and Metoprolol, dementia, questioned HF on furosemide, s/p double mastectomy for cystic breasts (1970s) presents to the ED sent in by podiatrist Dr. Linder and wound doctor Dr. Ferguson for gangrene of toes on the L foot.    - No Acute Vascular Surgical intervention indicated at this time.   - F/u JERE/PVRs ordered.     Vascular Surgery Pager #[phone] 89 F with PMH/PSH including AFib on Eliquis and Metoprolol, dementia, questioned HF on furosemide, s/p double mastectomy for cystic breasts (1970s) presents to the ED sent in by podiatrist Dr. Linder and wound doctor Dr. Ferguson for gangrene of toes on the L foot.    - recommend mackenzie/pvr  to eval severity of art insuff   will follow

## 2020-02-23 NOTE — CONSULT NOTE ADULT - COMMENTS
[X] All other systems negative aside from above.  [  ] ROS unobtainable because:
pt does not appear to be communicative and appears to be bedbound

## 2020-02-24 DIAGNOSIS — I96 GANGRENE, NOT ELSEWHERE CLASSIFIED: ICD-10-CM

## 2020-02-24 DIAGNOSIS — I77.1 STRICTURE OF ARTERY: ICD-10-CM

## 2020-02-24 LAB
ANION GAP SERPL CALC-SCNC: 10 MMOL/L — SIGNIFICANT CHANGE UP (ref 5–17)
BUN SERPL-MCNC: 22 MG/DL — SIGNIFICANT CHANGE UP (ref 7–23)
CALCIUM SERPL-MCNC: 9.5 MG/DL — SIGNIFICANT CHANGE UP (ref 8.4–10.5)
CHLORIDE SERPL-SCNC: 98 MMOL/L — SIGNIFICANT CHANGE UP (ref 96–108)
CO2 SERPL-SCNC: 28 MMOL/L — SIGNIFICANT CHANGE UP (ref 22–31)
CREAT SERPL-MCNC: 0.96 MG/DL — SIGNIFICANT CHANGE UP (ref 0.5–1.3)
GLUCOSE SERPL-MCNC: 79 MG/DL — SIGNIFICANT CHANGE UP (ref 70–99)
HCT VFR BLD CALC: 34.1 % — LOW (ref 34.5–45)
HGB BLD-MCNC: 9.8 G/DL — LOW (ref 11.5–15.5)
MCHC RBC-ENTMCNC: 28.2 PG — SIGNIFICANT CHANGE UP (ref 27–34)
MCHC RBC-ENTMCNC: 28.7 GM/DL — LOW (ref 32–36)
MCV RBC AUTO: 98 FL — SIGNIFICANT CHANGE UP (ref 80–100)
NRBC # BLD: 0 /100 WBCS — SIGNIFICANT CHANGE UP (ref 0–0)
PLATELET # BLD AUTO: 289 K/UL — SIGNIFICANT CHANGE UP (ref 150–400)
POTASSIUM SERPL-MCNC: 3.8 MMOL/L — SIGNIFICANT CHANGE UP (ref 3.5–5.3)
POTASSIUM SERPL-SCNC: 3.8 MMOL/L — SIGNIFICANT CHANGE UP (ref 3.5–5.3)
RBC # BLD: 3.48 M/UL — LOW (ref 3.8–5.2)
RBC # FLD: 17 % — HIGH (ref 10.3–14.5)
SODIUM SERPL-SCNC: 136 MMOL/L — SIGNIFICANT CHANGE UP (ref 135–145)
WBC # BLD: 12.95 K/UL — HIGH (ref 3.8–10.5)
WBC # FLD AUTO: 12.95 K/UL — HIGH (ref 3.8–10.5)

## 2020-02-24 PROCEDURE — 99232 SBSQ HOSP IP/OBS MODERATE 35: CPT

## 2020-02-24 PROCEDURE — 93923 UPR/LXTR ART STDY 3+ LVLS: CPT | Mod: 26

## 2020-02-24 RX ADMIN — Medication 400 MILLIGRAM(S): at 06:01

## 2020-02-24 RX ADMIN — Medication 50 MILLIGRAM(S): at 17:23

## 2020-02-24 RX ADMIN — Medication 50 MILLIGRAM(S): at 02:21

## 2020-02-24 RX ADMIN — Medication 40 MILLIGRAM(S): at 06:01

## 2020-02-24 RX ADMIN — Medication 50 MILLIGRAM(S): at 06:01

## 2020-02-24 RX ADMIN — Medication 50 MILLIGRAM(S): at 11:35

## 2020-02-24 NOTE — PROGRESS NOTE ADULT - PROBLEM SELECTOR PLAN 1
ALESSIA Root MD have personally  seen and examined the patient today and have noted the findings and formulated the plan of care.

## 2020-02-24 NOTE — PROGRESS NOTE ADULT - SUBJECTIVE AND OBJECTIVE BOX
Patient is a 89y old  Female who presents with a chief complaint of leg gangrene/ ulcers (24 Feb 2020 18:34)      SUBJECTIVE / OVERNIGHT EVENTS: No new complaints. daughter at bedside   Review of Systems  chest pain no  palpitations no  sob no  nausea no  headache no    MEDICATIONS  (STANDING):  apixaban 2.5 milliGRAM(s) Oral every 12 hours  aztreonam  IVPB 1000 milliGRAM(s) IV Intermittent every 8 hours  furosemide    Tablet 40 milliGRAM(s) Oral daily  metoprolol succinate ER 50 milliGRAM(s) Oral daily  pentoxifylline 400 milliGRAM(s) Oral two times a day    MEDICATIONS  (PRN):  acetaminophen   Tablet .. 650 milliGRAM(s) Oral every 6 hours PRN Temp greater or equal to 38.5C (101.3F), Mild Pain (1 - 3)      Vital Signs Last 24 Hrs  T(C): 36.4 (24 Feb 2020 13:26), Max: 36.6 (23 Feb 2020 21:09)  T(F): 97.5 (24 Feb 2020 13:26), Max: 97.9 (23 Feb 2020 21:09)  HR: 97 (24 Feb 2020 13:26) (82 - 97)  BP: 132/85 (24 Feb 2020 13:26) (129/79 - 157/88)  BP(mean): --  RR: 18 (24 Feb 2020 13:26) (18 - 18)  SpO2: 99% (24 Feb 2020 13:26) (97% - 99%)    PHYSICAL EXAM:  GENERAL: NAD, well-developed  HEAD:  Atraumatic, Normocephalic  EYES: EOMI, PERRLA, conjunctiva and sclera clear  NECK: Supple, No JVD  CHEST/LUNG: Clear to auscultation bilaterally; No wheeze  HEART: Regular rate and rhythm; No murmurs, rubs, or gallops  ABDOMEN: Soft, Nontender, Nondistended; Bowel sounds present  EXTREMITIES:  L leg with clean dressing  PSYCH: confused  NEUROLOGY: non-focal  SKIN: No rashes or lesions    LABS:                        9.8    12.95 )-----------( 289      ( 24 Feb 2020 07:30 )             34.1     02-24    136  |  98  |  22  ----------------------------<  79  3.8   |  28  |  0.96    Ca    9.5      24 Feb 2020 07:23                Culture - Blood (collected 21 Feb 2020 22:28)  Source: .Blood Blood-Venous  Preliminary Report (22 Feb 2020 23:02):    No growth to date.    Culture - Blood (collected 21 Feb 2020 22:27)  Source: .Blood Blood-Venous  Preliminary Report (22 Feb 2020 23:02):    No growth to date.        RADIOLOGY & ADDITIONAL TESTS:    Imaging Personally Reviewed:  < from: [facility] Physiol Extremity Lower 3+ Level, BI (02.24.20 @ 15:01) >  IMPRESSION: This study is extremely limited as the pressures could not be obtained due to noncompressibility.    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    There is additional disease at the popliteal tibial level on the left and additional disease at the left trifurcation. Small vessel disease at the left foot is also noted.     There is additional disease seen at the right trifurcation level. Small vessel disease at the left foot is also noted.    Clinically warranted arterial duplex of the arteries of the lower extremities can be performed.      < end of copied text >    Consultant(s) Notes Reviewed:      Care Discussed with Consultants/Other Providers:

## 2020-02-24 NOTE — PROGRESS NOTE ADULT - ASSESSMENT
89 F with PMH/PSH including AFib on Eliquis and Metoprolol, dementia, questioned HF on furosemide, s/p double mastectomy for cystic breasts (1970s) presents to the ED sent in by podiatrist Dr. Linder and wound doctor Dr. Ferguson for gangrene of toes on the L foot.    - extensive bedside d/w pt's daughters  and mackenzie/pvr reviewed  I Dimitri Root MD explained the indications risks and benefits of leg angiogram, possible endovascular intervention for possible limb salvage were explained  I do nont recommend a bypass intervention  in this non ambulatory demented /altered MS  Pt's daughters at this time do not want to proceed w any intervention and would like to plan for palliative vs conservative measure  as per them , pt's advanced directives include he wishes to NOT have a amp either  recommend to continue woundcare   will follow

## 2020-02-24 NOTE — CONSULT NOTE ADULT - ASSESSMENT
Impression:    Left lower leg wound  Right lower leg skin tear Harry 2B    Recommend:  1.) topical therapy: left lower leg wound - cleanse with sterile water, pat dry, apply woundres wound gel, then apply Acticoat Flex 3 mesh, cover with DSD, secure with bhavani every three days  right lower leg skin tear - cleanse with NS, pat dry, apply adaptic, cover with DSD, secure with bhavani every other day  2.) Left 4th and 5th toes - management per Podiatry  3.) Vascular consult  4.) Abx per Medicine/ ID  5.) Nutrition consult for optimization  6.) offload heels with z-monica boots  Will follow w/ you  Upon discharge f/u as outpatient at [facility] [address] [place] [address] [phone]  Seen with Dr. Campbell and discussed with clinical nurse and podiatry  Thank you for this consult  Carrie Lawson, NP-C, CWOCN [id number]

## 2020-02-24 NOTE — PROGRESS NOTE ADULT - SUBJECTIVE AND OBJECTIVE BOX
CC: F/U for Wound infection    Saw/spoke to patient. No fevers, no chills. No new complaints. Unchanged.    Allergies  penicillin (Hives)  sulfa drugs (Other)    ANTIMICROBIALS:  aztreonam  IVPB 1000 every 8 hours    PE:    Vital Signs Last 24 Hrs  T(C): 36.4 (24 Feb 2020 13:26), Max: 36.6 (23 Feb 2020 21:09)  T(F): 97.5 (24 Feb 2020 13:26), Max: 97.9 (23 Feb 2020 21:09)  HR: 97 (24 Feb 2020 13:26) (82 - 97)  BP: 132/85 (24 Feb 2020 13:26) (129/79 - 157/88)  RR: 18 (24 Feb 2020 13:26) (18 - 18)  SpO2: 99% (24 Feb 2020 13:26) (97% - 99%)    Gen: AOx1, awake, verbal, confused  CV: S1+S2 normal, nontachycardic  Resp: Clear bilat, no resp distress, no crackles/wheezes  Abd: Soft, nontender, +BS  Ext: LLE 4th and 5th digit gangrene, calf uclers    LABS:                        9.8    12.95 )-----------( 289      ( 24 Feb 2020 07:30 )             34.1     02-24    136  |  98  |  22  ----------------------------<  79  3.8   |  28  |  0.96    Ca    9.5      24 Feb 2020 07:23    MICROBIOLOGY:    .Blood Blood-Venous  02-21-20   No growth to date.     .Blood Blood-Venous  02-21-20   No growth to date.      RADIOLOGY:    No new available

## 2020-02-24 NOTE — PROGRESS NOTE ADULT - ASSESSMENT
89F with left foot 4th / 5th toe gangrene   -Pt seen and evaluated   -Left foot 4th & 5th digit gangrene- dry and stable, no acute signs of infection  -Awaiting JERE/PVR and vasc recs  -Will plan for possible 4th & 5th digit amputations pending vasc recs/revasc  -Abx per ID, appreciate recs  -Wound care team to manage leg wounds   -Continue local wound care with betadine and dry sterile dressing to the toes   -Seen w/ attending

## 2020-02-24 NOTE — PROGRESS NOTE ADULT - ATTENDING COMMENTS
II Dimitri Root MD have personally  seen and examined the patient today and have noted the findings and formulated the plan of care.  I Dimitri Root MD have personally seen and examined the patient at bedside today at  4  pm

## 2020-02-24 NOTE — PROGRESS NOTE ADULT - SUBJECTIVE AND OBJECTIVE BOX
Patient is a 89y old  Female who presents with a chief complaint of leg gangrene/ ulcers (24 Feb 2020 12:09)      Vascular Surgery Attending Progress Note    Interval HPI: pt w/o c/o  resting quietly    Medications:  acetaminophen   Tablet .. 650 milliGRAM(s) Oral every 6 hours PRN  apixaban 2.5 milliGRAM(s) Oral every 12 hours  aztreonam  IVPB 1000 milliGRAM(s) IV Intermittent every 8 hours  furosemide    Tablet 40 milliGRAM(s) Oral daily  metoprolol succinate ER 50 milliGRAM(s) Oral daily  pentoxifylline 400 milliGRAM(s) Oral two times a day      Vital Signs Last 24 Hrs  T(C): 36.4 (24 Feb 2020 13:26), Max: 36.6 (23 Feb 2020 21:09)  T(F): 97.5 (24 Feb 2020 13:26), Max: 97.9 (23 Feb 2020 21:09)  HR: 97 (24 Feb 2020 13:26) (82 - 97)  BP: 132/85 (24 Feb 2020 13:26) (129/79 - 157/88)  BP(mean): --  RR: 18 (24 Feb 2020 13:26) (18 - 18)  SpO2: 99% (24 Feb 2020 13:26) (97% - 99%)  I&O's Summary    23 Feb 2020 07:01  -  24 Feb 2020 07:00  --------------------------------------------------------  IN: 820 mL / OUT: 0 mL / NET: 820 mL    24 Feb 2020 07:01  -  24 Feb 2020 18:34  --------------------------------------------------------  IN: 460 mL / OUT: 0 mL / NET: 460 mL        Physical Exam:  Neuro  A&Ox3 VSS  Vascular:  left toe 45 gangrene stable     LABS:                        9.8    12.95 )-----------( 289      ( 24 Feb 2020 07:30 )             34.1     02-24    136  |  98  |  22  ----------------------------<  79  3.8   |  28  |  0.96    Ca    9.5      24 Feb 2020 07:23    JERE/PVR reviewed       ROSALINA GURROLA MD  [phone]

## 2020-02-24 NOTE — CONSULT NOTE ADULT - SUBJECTIVE AND OBJECTIVE BOX
Wound Surgery Consult Note:    HPI:  89F with PMH/PSH including AFib on Eliquis and Metoprolol, dementia, ?HF on furosemide, s/p double mastectomy for cystic breasts (1970s) presents to the ED sent in by podiatrist Dr. Linder and wound doctor Dr. Ferguson for gangrene of toes on the L foot.  Daughter reports that she has been following with wound care, Dr. Ferguson, and recently completed a course of Flagyl and Ciprofloxacin.  Reports was started on Pentoxifylline last week for attempt at improved blood flow.  Reports ankle wound is about 2.5 yrs old, reports the wounds on the distal lower leg are from 11/21/19.  Reports the toe wounds are from beginning of January.  Reports she had a scrape on the distal lower leg, ?from a transfer, initially went to an ED because could not control bleeding.  Reports then followed with PMD for both the toes and the distal lower leg until 2/6/20 when she began seeing wound care.  Reports on 1/30/20 PMD reported wounds malodorous and was referred to wound center but toes already turning black.  Reports on 2/6/20 Dr. Ferguson Rx'ed Flagy/Cipro for her toes.  Reports completed course of Flagyl and Cipro.  Reports today was her third visit with wound care and was sent in by Mando Linder and Phyllis .  Daughter denies recent illness including fevers, cough, vomiting, diarrhea, change in urinary habits.  Unable to obtain ROS from patient given dementia.  On exam, NAD, head NCAT, PERRL, FROM at neck, no tenderness to midline palpation, no stepoffs along length of spine, lungs CTAB with good inspiratory effort, no wheezing, no rhonchi, no rales, +S1S2, irregularly irregular, no m/r/g, abdomen soft with +BS, NT, ND, no CVAT, moving all extremities, RLE distal to mid lower leg cold as compared to LLE, LLE with gangrene to 4th and 5th toes (black), 1.5 cm round wound to the lateral L malleolus, and two large wounds to the distal L lower leg once posterior one anterolateral both about 6cm at longest down to muscle/tendon; A/P: 89F with gangrene of the L 4-5th toes with PVD, here for admission, podiatry at bedside when this MD evaluating patient (21 Feb 2020 17:42)    Ms. Bloch was encountered in a reclining chair with her 2 daughters at the bedside. She was sleeping through most of visit but was easily arousable but non verbal. Her history and ROS was provided by her daughters who stated that the Left lower leg wounds started as a traumatic wound but have not healed. Gangrene to Left 4th and 5th toes per HPI above. She is followed Dr. Linder and Dr. Ferguson at the [facility].    PAST MEDICAL & SURGICAL HISTORY:  PVD (peripheral vascular disease)  HTN (hypertension)  Dementia  Afib  History of total mastectomy    REVIEW OF SYSTEMS  General: no fever or chills	  Respiratory and Thorax: no SOB or cough  Cardiovascular:	no CP  Gastrointestinal:	no n/v  Genitourinary:	no dysuria  Musculoskeletal:	 ambulatory with walker and assist  Neurological:	dementia  Vascular:	PVD  Skin: per HPI above    Allergic/Immunologic:	  Pt unable to offer  Skin/ MSK: see HPI  All other systems negative    MEDICATIONS  (STANDING):  apixaban 2.5 milliGRAM(s) Oral every 12 hours  aztreonam  IVPB 1000 milliGRAM(s) IV Intermittent every 8 hours  furosemide    Tablet 40 milliGRAM(s) Oral daily  metoprolol succinate ER 50 milliGRAM(s) Oral daily  pentoxifylline 400 milliGRAM(s) Oral two times a day    MEDICATIONS  (PRN):  acetaminophen   Tablet .. 650 milliGRAM(s) Oral every 6 hours PRN Temp greater or equal to 38.5C (101.3F), Mild Pain (1 - 3)    Allergies    penicillin (Hives)  sulfa drugs (Other)    Intolerances    SOCIAL HISTORY:  (+)HHA/ lives alone in private home; Denies smoking, ETOH, drugs    FAMILY HISTORY: non contributory    Vital Signs Last 24 Hrs  T(C): 36.5 (24 Feb 2020 05:59), Max: 36.6 (23 Feb 2020 21:09)  T(F): 97.7 (24 Feb 2020 05:59), Max: 97.9 (23 Feb 2020 21:09)  HR: 82 (24 Feb 2020 05:59) (82 - 93)  BP: 157/88 (24 Feb 2020 05:59) (129/79 - 157/88)  BP(mean): --  RR: 18 (24 Feb 2020 05:59) (18 - 18)  SpO2: 99% (24 Feb 2020 05:59) (96% - 99%)    Physical Exam:  General: somnolent, frail  Respiratory: no SOB on room air  Gastrointestinal: soft NT/ND  Neurology: nonverbal, not following commands  Musculoskeletal: no contractures or deformities  Vascular: no BLE edema, DP/PT pulses not manually palpable, right foot cooler than left foot  Skin:  Left lateral/posterior lower leg wound - L 8.5cm x W 5cm x D 0.5cm, wound bed covered with adherent yellow slough, moderate serosanguinous drainage  Left 4th and 5th toes - dry gangrene, no drainage  Right lateral lower leg skin tear with 50% of skin flap missing, + ecchymosis without hematoma, no necrotic tissue, small amount for serosanguinous draiange, No odor, erythema, increased warmth, tenderness, induration, fluctuance associated with any of the above.      LABS:  02-24    136  |  98  |  22  ----------------------------<  79  3.8   |  28  |  0.96    Ca    9.5      24 Feb 2020 07:23                            9.8    12.95 )-----------( 289      ( 24 Feb 2020 07:30 )             34.1           RADIOLOGY & ADDITIONAL STUDIES:  Cultures:  2.21.2020 - blood cx - neg to date x 2

## 2020-02-24 NOTE — PROGRESS NOTE ADULT - ASSESSMENT
89 f with    Foot ulceration/ Gangrene  - wound care  - Vascular follow   - Podiatry follow. May need toes amputation   - antibiotics  - ID follow    Afib   - rate control  - continue AC    Dementia   - supportive care     HTN (hypertension)   -  control    PVD (peripheral vascular disease)  - Vascular evaluation noted    Hypokalemia  - supplement    d/w daughter at length     John Guajardo MD pager [phone]

## 2020-02-24 NOTE — PROGRESS NOTE ADULT - SUBJECTIVE AND OBJECTIVE BOX
Podiatry pager #: [phone] ([place])/ [phone] ([facility])    Patient is a 89y old  Female who presents with a chief complaint of leg gangrene/ ulcers (23 Feb 2020 15:45)       INTERVAL HPI/OVERNIGHT EVENTS:  Patient seen and evaluated at bedside.  Pt is resting comfortable in NAD. Denies N/V/F/C.     Allergies    penicillin (Hives)  sulfa drugs (Other)    Intolerances        Vital Signs Last 24 Hrs  T(C): 36.5 (24 Feb 2020 05:59), Max: 36.6 (23 Feb 2020 21:09)  T(F): 97.7 (24 Feb 2020 05:59), Max: 97.9 (23 Feb 2020 21:09)  HR: 82 (24 Feb 2020 05:59) (82 - 93)  BP: 157/88 (24 Feb 2020 05:59) (129/79 - 157/88)  BP(mean): --  RR: 18 (24 Feb 2020 05:59) (18 - 18)  SpO2: 99% (24 Feb 2020 05:59) (96% - 99%)    LABS:                        9.8    12.95 )-----------( 289      ( 24 Feb 2020 07:30 )             34.1     02-24    136  |  98  |  22  ----------------------------<  79  3.8   |  28  |  0.96    Ca    9.5      24 Feb 2020 07:23          CAPILLARY BLOOD GLUCOSE          Lower Extremity Physical Exam:  Vascular: DP/PT 0/4, B/L, CFT absent Left foot 4th + 5th toes,  Temperature gradient warm to cool B/L.   Neuro: Epicritic sensation diminished to the level of toes, B/L.  Musculoskeletal/Ortho: no gross deformities  Skin: dry gangrene to left foot 4th and 5th toes, 2 large ischemic leg wounds with fibrogranular base - no acute signs of infection

## 2020-02-24 NOTE — PROGRESS NOTE ADULT - ASSESSMENT
89 F with PMH/PSH including AFib on Eliquis and Metoprolol, dementia, ?HF on furosemide, s/p double mastectomy for cystic breasts (1970s) presents to the ED sent in by podiatrist Dr. Linder and wound doctor Dr. Ferguson for gangrene of toes on the L foot.  Leukocytosis, no fever  LLE 4th and 5th digit gangrene  L lateral calf ulcers--clean chronic venous stasis ulcers  Leukocytosis reactive to gangrene > active infection?  WBC improved--implies response to abx vs hydration or other therapy? Complete brief course for possible wound infection  Overall,  1) LE wound infection  - Vanco by level (repeat level tomorrow AM)  - Aztreonam 1g q 8  - Plan to continue abx through tomorrow (2/25) then DC (to complete 5 day course)  - Monitor LE wounds for signs infection  - F/U podiatry  - F/U pending BCXs  2) Gangrene  - Follow up vascular eval, any surgical planning?    Victor Hugo Gentile MD  Pager [phone]  After 5pm and on weekends call [phone]

## 2020-02-24 NOTE — CONSULT NOTE ADULT - ATTENDING COMMENTS
Above noted   89 yo, non ambulatory. Holocaust survivor , with advanced dementia  status discussed in detail with both daughters at bedside  Gangrene of left toes is currently dry, and left lateral leg wound has healthy granulation at base  Both legs are relatively ischemic
I Dimitri Root MD performed a history and physical exam of the patient and discussed  the findings and plan with the house officer. I reviewed the resident note and agree with the findings and plan

## 2020-02-25 LAB — VANCOMYCIN FLD-MCNC: 6.6 UG/ML — SIGNIFICANT CHANGE UP

## 2020-02-25 PROCEDURE — 99232 SBSQ HOSP IP/OBS MODERATE 35: CPT

## 2020-02-25 RX ORDER — VANCOMYCIN HCL 1 G
1000 VIAL (EA) INTRAVENOUS ONCE
Refills: 0 | Status: COMPLETED | OUTPATIENT
Start: 2020-02-25 | End: 2020-02-25

## 2020-02-25 RX ADMIN — Medication 50 MILLIGRAM(S): at 16:41

## 2020-02-25 RX ADMIN — Medication 50 MILLIGRAM(S): at 09:17

## 2020-02-25 RX ADMIN — Medication 50 MILLIGRAM(S): at 01:08

## 2020-02-25 RX ADMIN — APIXABAN 2.5 MILLIGRAM(S): 2.5 TABLET, FILM COATED ORAL at 10:09

## 2020-02-25 RX ADMIN — APIXABAN 2.5 MILLIGRAM(S): 2.5 TABLET, FILM COATED ORAL at 21:26

## 2020-02-25 RX ADMIN — Medication 250 MILLIGRAM(S): at 17:46

## 2020-02-25 NOTE — PROGRESS NOTE ADULT - ATTENDING COMMENTS
I Dimitri Root MD have personally  seen and examined the patient today and have noted the findings and formulated the plan of care.  I Dimitri Root MD have personally seen and examined the patient at bedside today at 8 am

## 2020-02-25 NOTE — PROGRESS NOTE ADULT - SUBJECTIVE AND OBJECTIVE BOX
Patient is a 89y old  Female who presents with a chief complaint of leg gangrene/ ulcers (25 Feb 2020 15:54)      SUBJECTIVE / OVERNIGHT EVENTS: No new complaints. Family at bedside.   Review of Systems  chest pain no  palpitations no  sob no  nausea no  headache no    MEDICATIONS  (STANDING):  apixaban 2.5 milliGRAM(s) Oral every 12 hours  aztreonam  IVPB 1000 milliGRAM(s) IV Intermittent every 8 hours  furosemide    Tablet 40 milliGRAM(s) Oral daily  metoprolol succinate ER 50 milliGRAM(s) Oral daily  pentoxifylline 400 milliGRAM(s) Oral two times a day    MEDICATIONS  (PRN):  acetaminophen   Tablet .. 650 milliGRAM(s) Oral every 6 hours PRN Temp greater or equal to 38.5C (101.3F), Mild Pain (1 - 3)      Vital Signs Last 24 Hrs  T(C): 36.4 (25 Feb 2020 13:25), Max: 37.4 (25 Feb 2020 05:19)  T(F): 97.5 (25 Feb 2020 13:25), Max: 99.3 (25 Feb 2020 05:19)  HR: 76 (25 Feb 2020 14:51) (76 - 96)  BP: 136/73 (25 Feb 2020 14:51) (120/73 - 136/73)  BP(mean): --  RR: 18 (25 Feb 2020 14:51) (18 - 18)  SpO2: 96% (25 Feb 2020 14:51) (96% - 98%)    PHYSICAL EXAM:  GENERAL: NAD, well-developed  HEAD:  Atraumatic, Normocephalic  EYES: EOMI, PERRLA, conjunctiva and sclera clear  NECK: Supple, No JVD  CHEST/LUNG: Clear to auscultation bilaterally; No wheeze  HEART: Regular rate and rhythm; No murmurs, rubs, or gallops  ABDOMEN: Soft, Nontender, Nondistended; Bowel sounds present  EXTREMITIES:  L leg with wounds and clean dressing.  PSYCH: confused   NEUROLOGY: non-focal  SKIN: No rashes or lesions    LABS:                        9.8    12.95 )-----------( 289      ( 24 Feb 2020 07:30 )             34.1     02-24    136  |  98  |  22  ----------------------------<  79  3.8   |  28  |  0.96    Ca    9.5      24 Feb 2020 07:23                  RADIOLOGY & ADDITIONAL TESTS:    Imaging Personally Reviewed:    Consultant(s) Notes Reviewed:      Care Discussed with Consultants/Other Providers:

## 2020-02-25 NOTE — PROGRESS NOTE ADULT - ASSESSMENT
89 f with    Foot ulceration/ Gangrene  - wound care  - Vascular follow noted  - Podiatry follow. May need toes amputation   - antibiotics  - ID follow  - family refuse angio and possible revascularization     Afib   - rate control  - continue AC    Dementia   - supportive care     HTN (hypertension)   -  control    PVD (peripheral vascular disease)  - Vascular evaluation noted    Hypokalemia  - supplement    d/w daughters at length     GO evaluation    Hospice referral      John Guajardo MD pager [phone]

## 2020-02-25 NOTE — PROGRESS NOTE ADULT - SUBJECTIVE AND OBJECTIVE BOX
Patient is a 89y old  Female who presents with a chief complaint of leg gangrene/ ulcers (24 Feb 2020 18:34)      Vascular Surgery Attending Progress Note    Interval HPI: pt w/o c/o     Medications:  acetaminophen   Tablet .. 650 milliGRAM(s) Oral every 6 hours PRN  apixaban 2.5 milliGRAM(s) Oral every 12 hours  aztreonam  IVPB 1000 milliGRAM(s) IV Intermittent every 8 hours  furosemide    Tablet 40 milliGRAM(s) Oral daily  metoprolol succinate ER 50 milliGRAM(s) Oral daily  pentoxifylline 400 milliGRAM(s) Oral two times a day      Vital Signs Last 24 Hrs  T(C): 36.4 (25 Feb 2020 13:25), Max: 37.4 (25 Feb 2020 05:19)  T(F): 97.5 (25 Feb 2020 13:25), Max: 99.3 (25 Feb 2020 05:19)  HR: 76 (25 Feb 2020 14:51) (76 - 96)  BP: 136/73 (25 Feb 2020 14:51) (120/73 - 136/73)  BP(mean): --  RR: 18 (25 Feb 2020 14:51) (18 - 18)  SpO2: 96% (25 Feb 2020 14:51) (96% - 98%)  I&O's Summary    24 Feb 2020 07:01  -  25 Feb 2020 07:00  --------------------------------------------------------  IN: 580 mL / OUT: 0 mL / NET: 580 mL    25 Feb 2020 07:01  -  25 Feb 2020 15:54  --------------------------------------------------------  IN: 410 mL / OUT: 0 mL / NET: 410 mL        Physical Exam:  Neuro  A&Ox1 VSS  Vascular:  toe ischemia and wounds stable     LABS:                        9.8    12.95 )-----------( 289      ( 24 Feb 2020 07:30 )             34.1     02-24    136  |  98  |  22  ----------------------------<  79  3.8   |  28  |  0.96    Ca    9.5      24 Feb 2020 07:23      JERE/PVR reviewed     ROSALINA GURROLA MD  [phone]

## 2020-02-25 NOTE — PROGRESS NOTE ADULT - SUBJECTIVE AND OBJECTIVE BOX
CC: F/U for Wound Infection    Saw/spoke to patient. No fevers, no chills. No new complaints. Fatigued.    Allergies  penicillin (Hives)  sulfa drugs (Other)    ANTIMICROBIALS:  aztreonam  IVPB 1000 every 8 hours    PE:    Vital Signs Last 24 Hrs  T(C): 36.4 (25 Feb 2020 13:25), Max: 37.4 (25 Feb 2020 05:19)  T(F): 97.5 (25 Feb 2020 13:25), Max: 99.3 (25 Feb 2020 05:19)  HR: 76 (25 Feb 2020 14:51) (76 - 96)  BP: 136/73 (25 Feb 2020 14:51) (120/73 - 136/73)  RR: 18 (25 Feb 2020 14:51) (18 - 18)  SpO2: 96% (25 Feb 2020 14:51) (96% - 98%)    Gen: AOx3, NAD, non-toxic, pleasant  CV: S1+S2 normal, nontachycardic  Resp: Clear bilat, no resp distress, no crackles/wheezes  Abd: Soft, nontender, +BS  Ext: LLE dry gangrene; ulcers at calf/shin, no purulence    LABS:                        9.8    12.95 )-----------( 289      ( 24 Feb 2020 07:30 )             34.1     02-24    136  |  98  |  22  ----------------------------<  79  3.8   |  28  |  0.96    Ca    9.5      24 Feb 2020 07:23    MICROBIOLOGY:  Vancomycin Level, Random: 6.6 ug/mL (02-25-20 @ 10:02)    .Blood Blood-Venous  02-21-20   No growth to date.    .Blood Blood-Venous  02-21-20   No growth to date.    RADIOLOGY:    2/24 USG:    IMPRESSION: This study is extremely limited as the pressures could not be obtained due to noncompressibility.    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    There is additional disease at the popliteal tibial level on the left and additional disease at the left trifurcation. Small vessel disease at the left foot is also noted.     There is additional disease seen at the right trifurcation level. Small vessel disease at the left foot is also noted.    Clinically warranted arterial duplex of the arteries of the lower extremities can be performed.

## 2020-02-25 NOTE — PROGRESS NOTE ADULT - ASSESSMENT
89 F with PMH/PSH including AFib on Eliquis and Metoprolol, dementia, questioned HF on furosemide, s/p double mastectomy for cystic breasts (1970s) presents to the ED sent in by podiatrist Dr. Linder and wound doctor Dr. Ferguson for gangrene of toes on the L foot.    - continue woundcare  await pt's daughters decision   will follow

## 2020-02-25 NOTE — PROGRESS NOTE ADULT - ASSESSMENT
89 F with PMH/PSH including AFib on Eliquis and Metoprolol, dementia, ?HF on furosemide, s/p double mastectomy for cystic breasts (1970s) presents to the ED sent in by podiatrist Dr. Linder and wound doctor Dr. Ferguson for gangrene of toes on the L foot  Leukocytosis, no fever  LLE 4th and 5th digit gangrene  L lateral calf ulcers--clean chronic venous stasis ulcers  Leukocytosis reactive to gangrene > active infection?  Residual WBC likely due to gangrene, doubt persistent infection, complete treatment course  Overall,  1) LE wound infection  - Vanco 1g x 1 today (I ordered)--last dose  - Aztreonam 1g q 8  - Plan to continue abx through today (2/25) then discontinue (to complete 5 day course)  - Monitor LE wounds for signs infection  - F/U podiatry  - F/U pending BCXs  2) Gangrene  - Follow up vascular eval, any surgical planning?    Victor Hugo Gentile MD  Pager [phone]  After 5pm and on weekends call [phone]

## 2020-02-25 NOTE — PROGRESS NOTE ADULT - PROBLEM SELECTOR PLAN 1
I Dimitri Root MD have seen and examined the patient today and agree with  the  evaluation, assessment and plan of the surgical house officer

## 2020-02-26 ENCOUNTER — TRANSCRIPTION ENCOUNTER (OUTPATIENT)
Age: 85
End: 2020-02-26

## 2020-02-26 LAB
CULTURE RESULTS: SIGNIFICANT CHANGE UP
CULTURE RESULTS: SIGNIFICANT CHANGE UP
SPECIMEN SOURCE: SIGNIFICANT CHANGE UP
SPECIMEN SOURCE: SIGNIFICANT CHANGE UP

## 2020-02-26 PROCEDURE — 99232 SBSQ HOSP IP/OBS MODERATE 35: CPT

## 2020-02-26 RX ADMIN — Medication 650 MILLIGRAM(S): at 10:26

## 2020-02-26 RX ADMIN — Medication 50 MILLIGRAM(S): at 02:27

## 2020-02-26 RX ADMIN — Medication 50 MILLIGRAM(S): at 06:22

## 2020-02-26 RX ADMIN — Medication 650 MILLIGRAM(S): at 10:56

## 2020-02-26 RX ADMIN — Medication 40 MILLIGRAM(S): at 06:22

## 2020-02-26 RX ADMIN — Medication 400 MILLIGRAM(S): at 17:10

## 2020-02-26 RX ADMIN — APIXABAN 2.5 MILLIGRAM(S): 2.5 TABLET, FILM COATED ORAL at 09:16

## 2020-02-26 RX ADMIN — Medication 400 MILLIGRAM(S): at 06:22

## 2020-02-26 RX ADMIN — APIXABAN 2.5 MILLIGRAM(S): 2.5 TABLET, FILM COATED ORAL at 22:54

## 2020-02-26 NOTE — PROGRESS NOTE ADULT - ASSESSMENT
89 f with    Foot ulceration/ Gangrene  - wound care  - Vascular follow noted No intervention  - Podiatry follow. Family refusing toes amputation   - antibiotics  - ID follow  - family refuse angio and possible revascularization     Afib   - rate control  - continue AC    Dementia   - supportive care     HTN (hypertension)   -  control    PVD (peripheral vascular disease)  - Vascular evaluation noted    Hypokalemia  - supplement    d/w daughter at length     GOC evaluation     Hospice evaluation    DCP home with hospice care in progress.      John Guajardo MD pager [phone]

## 2020-02-26 NOTE — PROGRESS NOTE ADULT - ASSESSMENT
89 F with PMH/PSH including AFib on Eliquis and Metoprolol, dementia, questioned HF on furosemide, s/p double mastectomy for cystic breasts (1970s) presents to the ED sent in by podiatrist Dr. Linder and wound doctor Dr. Ferguson for gangrene of toes on the L foot.    - continue woundcare as per podiatry  podiatry input and fam decision reviewed/noted  reconsult prn of family/daughters opt for lle amp

## 2020-02-26 NOTE — DISCHARGE NOTE PROVIDER - HOSPITAL COURSE
89 F with PMH/PSH including AFib on Eliquis and Metoprolol, dementia, ?HF on furosemide, s/p double mastectomy for cystic breasts (1970s) presents to the ED sent in by podiatrist Dr. Linder and wound doctor Dr. Ferguson for gangrene of toes on the L foot; seen by podiatry-local wound care-seen by wound care    Leukocytosis, no fever    LLE 4th and 5th digit gangrene    L lateral calf ulcers--clean chronic venous stasis ulcers    seen  by ID and antibiotic management per ID.    Residual WBC likely due to gangrene, doubt persistent infection, completed treatment course    Arterial insufficiency with ischemic ulcer. seen by vascular and pt family do not want any surgery; wants to continue with symptom management    had Vanco  and azactam 5 day course.    seen by hospice as per pt family wish 89 F with PMH/PSH including AFib on Eliquis and Metoprolol, dementia, ?HF on furosemide, s/p double mastectomy for cystic breasts (1970s) presents to the ED sent in by podiatrist Dr. Linder and wound doctor Dr. Ferguson for gangrene of toes on the L foot; seen by podiatry-local wound care-seen by wound care    Leukocytosis, no fever    LLE 4th and 5th digit gangrene    L lateral calf ulcers--clean chronic venous stasis ulcers    seen  by ID and antibiotic management per ID.    Residual WBC likely due to gangrene, doubt persistent infection, completed treatment course    Arterial insufficiency with ischemic ulcer. seen by vascular and pt family do not want any surgery; wants to continue with symptom management    had Vanco  and azactam 5 day course.    seen by hospice as per pt family wish and pt is cleared for discharge home with home hospice.

## 2020-02-26 NOTE — DISCHARGE NOTE PROVIDER - NSDCCPCAREPLAN_GEN_ALL_CORE_FT
PRINCIPAL DISCHARGE DIAGNOSIS  Diagnosis: Gangrenous toe  Assessment and Plan of Treatment: antibiotic treatment completed  wound care      SECONDARY DISCHARGE DIAGNOSES  Diagnosis: Ulcer of left lower extremity with muscle involvement without evidence of necrosis  Assessment and Plan of Treatment: wound care PRINCIPAL DISCHARGE DIAGNOSIS  Diagnosis: Gangrenous toe  Assessment and Plan of Treatment: antibiotic treatment completed  wound care as presribed  follow up by podiatry/      SECONDARY DISCHARGE DIAGNOSES  Diagnosis: Ulcer of left lower extremity with muscle involvement without evidence of necrosis  Assessment and Plan of Treatment: wound care as prescribed  follow up by wound care PRINCIPAL DISCHARGE DIAGNOSIS  Diagnosis: Gangrenous toe  Assessment and Plan of Treatment: antibiotic treatment completed  wound care as presribed  follow up by podiatry/hospice      SECONDARY DISCHARGE DIAGNOSES  Diagnosis: Ulcer of left lower extremity with muscle involvement without evidence of necrosis  Assessment and Plan of Treatment: wound care as prescribed  follow up by wound care/ hospice    Diagnosis: Arterial insufficiency with ischemic ulcer  Assessment and Plan of Treatment: Arterial insufficiency with ischemic ulcer  seen by vascular-  medication as prescribed  follow up by home hospice

## 2020-02-26 NOTE — DISCHARGE NOTE PROVIDER - CARE PROVIDER_API CALL
Victor Hugo Campbell)  Surgery  [facility] [address], [address]  [place] [address]  Phone: [phone]  Fax: [phone]  Follow Up Time:     Victor Hugo Linder (DPM)  Podiatric Medicine and Surgery  [address] [facility], [place] [address]  Phone: [phone]  Fax: [phone]  Follow Up Time:     victor hugo marquez  pmd  [phone]  Phone: (   )    -  Fax: (   )    -  Follow Up Time: Victor Hugo Campbell)  Surgery  [address] [facility] [address], [address]  [place] [address]  Phone: [phone]  Fax: [phone]  Follow Up Time: 2 weeks    Victor Hugo Linder (DPM)  Podiatric Medicine and Surgery  [address] [facility], [place] [address]  Phone: [phone]  Fax: [phone]  Follow Up Time:     victor hugo marquez  pmd  [phone]  Phone: (   )    -  Fax: (   )    -  Follow Up Time:     hospice team,   Phone: (   )    -  Fax: (   )    -  Follow Up Time:     Victor Hugo Gentile)  Infectious Disease; Internal Medicine  [address] [place] [address]  Phone: [phone]  Fax: [phone]  Follow Up Time:

## 2020-02-26 NOTE — DISCHARGE NOTE PROVIDER - NSDCFUADDINST_GEN_ALL_CORE_FT
left lower leg wound - cleanse with sterile water, pat dry, apply woundres wound gel, then apply Acticoat Flex 3 mesh, cover with DSD, secure with bhavani every three days  right lower leg skin tear - cleanse with NS, pat dry, apply adaptic, cover with DSD, secure with bhavani every other day'  left foot 4thand 5th toe- apply betadine inbetween toes and dressing daily left lower leg wound - cleanse with sterile water, pat dry, apply wound wound gel, then apply Acticoat Flex 3 mesh, cover with DSD, secure with bhavani every three days  right lower leg skin tear - cleanse with NS, pat dry, apply adaptic, cover with DSD, secure with bhavani every other day'  left foot 4thand 5th toe- apply betadine inbetween toes and dressing daily

## 2020-02-26 NOTE — PROGRESS NOTE ADULT - SUBJECTIVE AND OBJECTIVE BOX
Patient is a 89y old  Female who presents with a chief complaint of leg gangrene/ ulcers (26 Feb 2020 12:35)      SUBJECTIVE / OVERNIGHT EVENTS: No new complaints. Daughter at bedside.  Review of Systems  chest pain no  palpitations no  sob no  nausea no  headache no    MEDICATIONS  (STANDING):  apixaban 2.5 milliGRAM(s) Oral every 12 hours  furosemide    Tablet 40 milliGRAM(s) Oral daily  metoprolol succinate ER 50 milliGRAM(s) Oral daily  pentoxifylline 400 milliGRAM(s) Oral two times a day    MEDICATIONS  (PRN):  acetaminophen   Tablet .. 650 milliGRAM(s) Oral every 6 hours PRN Temp greater or equal to 38.5C (101.3F), Mild Pain (1 - 3)      Vital Signs Last 24 Hrs  T(C): 36.3 (26 Feb 2020 11:56), Max: 37 (25 Feb 2020 21:27)  T(F): 97.4 (26 Feb 2020 11:56), Max: 98.6 (25 Feb 2020 21:27)  HR: 88 (26 Feb 2020 11:56) (88 - 90)  BP: 129/72 (26 Feb 2020 11:56) (128/76 - 132/78)  BP(mean): --  RR: 17 (26 Feb 2020 11:56) (17 - 18)  SpO2: 97% (26 Feb 2020 11:56) (95% - 97%)    PHYSICAL EXAM:  GENERAL: NAD, well-developed  HEAD:  Atraumatic, Normocephalic  EYES: EOMI, PERRLA, conjunctiva and sclera clear  NECK: Supple, No JVD  CHEST/LUNG: Clear to auscultation bilaterally; No wheeze  HEART: Regular rate and rhythm; No murmurs, rubs, or gallops  ABDOMEN: Soft, Nontender, Nondistended; Bowel sounds present  EXTREMITIES:  L leg with chronic wounds and gangrenous toes  PSYCH: confused  NEUROLOGY: non-focal  SKIN: No rashes or lesions    LABS:                      RADIOLOGY & ADDITIONAL TESTS:    Imaging Personally Reviewed:    Consultant(s) Notes Reviewed:      Care Discussed with Consultants/Other Providers:

## 2020-02-26 NOTE — PROGRESS NOTE ADULT - SUBJECTIVE AND OBJECTIVE BOX
Patient is a 89y old  Female who presents with a chief complaint of leg gangrene/ ulcers (26 Feb 2020 11:04)      Vascular Surgery Attending Progress Note    Interval HPI: pt w/o c/o     Medications:  acetaminophen   Tablet .. 650 milliGRAM(s) Oral every 6 hours PRN  apixaban 2.5 milliGRAM(s) Oral every 12 hours  furosemide    Tablet 40 milliGRAM(s) Oral daily  metoprolol succinate ER 50 milliGRAM(s) Oral daily  pentoxifylline 400 milliGRAM(s) Oral two times a day      Vital Signs Last 24 Hrs  T(C): 36.9 (26 Feb 2020 06:21), Max: 37 (25 Feb 2020 21:27)  T(F): 98.4 (26 Feb 2020 06:21), Max: 98.6 (25 Feb 2020 21:27)  HR: 88 (26 Feb 2020 06:21) (76 - 92)  BP: 128/76 (26 Feb 2020 06:21) (123/70 - 136/73)  BP(mean): --  RR: 18 (26 Feb 2020 06:21) (18 - 18)  SpO2: 95% (26 Feb 2020 06:21) (95% - 98%)  I&O's Summary    25 Feb 2020 07:01  -  26 Feb 2020 07:00  --------------------------------------------------------  IN: 670 mL / OUT: 0 mL / NET: 670 mL    26 Feb 2020 07:01  -  26 Feb 2020 11:27  --------------------------------------------------------  IN: 240 mL / OUT: 0 mL / NET: 240 mL        Physical Exam:  Neuro  A&Ox1 VSS  Vascular:  left toe 45 ulcer and gangrene remain               ROSALINA GURROLA MD  [phone]

## 2020-02-26 NOTE — DISCHARGE NOTE PROVIDER - PROVIDER TOKENS
PROVIDER:[TOKEN:[[id number]]],PROVIDER:[TOKEN:[[id number]]],FREE:[LAST:[alma],FIRST:[colby],PHONE:[(   )    -],FAX:[(   )    -],ADDRESS:[[facility]  [phone]]] PROVIDER:[TOKEN:[[id number]],FOLLOWUP:[2 weeks]],PROVIDER:[TOKEN:[[id number]]],FREE:[LAST:[alma],FIRST:[colby],PHONE:[(   )    -],FAX:[(   )    -],ADDRESS:[[facility]  [phone]]],FREE:[LAST:[hospice team],PHONE:[(   )    -],FAX:[(   )    -]],PROVIDER:[TOKEN:[[id number]]]

## 2020-02-26 NOTE — DISCHARGE NOTE PROVIDER - NSDCMRMEDTOKEN_GEN_ALL_CORE_FT
Eliquis 5 mg oral tablet: 1 tab(s) orally 2 times a day  Lasix 40 mg oral tablet: 1 tab(s) orally once a day  metoprolol succinate 50 mg oral tablet, extended release: 1 tab(s) orally once a day  pentoxifylline 400 mg oral tablet, extended release: 1 tab(s) orally 2 times a day acetaminophen 325 mg oral tablet: 2 tab(s) orally every 6 hours, As needed,  Mild Pain (1 - 3)  Eliquis 5 mg oral tablet: 1 tab(s) orally 2 times a day  Lasix 40 mg oral tablet: 1 tab(s) orally once a day  metoprolol succinate 50 mg oral tablet, extended release: 1 tab(s) orally once a day  pentoxifylline 400 mg oral tablet, extended release: 1 tab(s) orally 2 times a day

## 2020-02-26 NOTE — PROGRESS NOTE ADULT - PROBLEM SELECTOR PLAN 2
I Dimitri Root MD have seen and examined the patient today and agree with  the  evaluation, assessment and plan of the surgical house officer Advancement Flap (Double) Text: Given the location of the defect and the proximity to free margins a double advancement flap was deemed most appropriate.  Using a sterile surgical marker, the appropriate advancement flaps were drawn incorporating the defect and placing the expected incisions within the relaxed skin tension lines where possible.    The area thus outlined was incised deep to adipose tissue with a #15c scalpel blade.  The skin margins were undermined to an appropriate distance in all directions utilizing iris scissors.

## 2020-02-26 NOTE — PROGRESS NOTE ADULT - SUBJECTIVE AND OBJECTIVE BOX
DEBRA [id number]  89y  Female  Gangrene, not elsewhere classified  Handoff  MEWS Score  PVD (peripheral vascular disease)  HTN (hypertension)  HTN, age 0-18  Dementia  Afib  Gangrenous toe  Gangrene of toe of left foot  Arterial insufficiency with ischemic ulcer  History of total mastectomy  LEFT FOOT GANGRENE  Ulcer of left lower extremity with muscle involvement without evidence of necrosis    acetaminophen   Tablet .. 650 milliGRAM(s) Oral every 6 hours PRN  apixaban 2.5 milliGRAM(s) Oral every 12 hours  furosemide    Tablet 40 milliGRAM(s) Oral daily  metoprolol succinate ER 50 milliGRAM(s) Oral daily  pentoxifylline 400 milliGRAM(s) Oral two times a day  penicillin (Hives)  sulfa drugs (Other)    Patient seen at bedside resting comfortably with daughter present. Stable dry gangrene at LEFT 4,5 toes. Patient's family electing to NOT have resection of gangrenous toes and is following up with wound care for leg wounds. Spoke with Dr. Root, no revascularization possibilities. Patient's family is contemplating Hospice care. Podiatry signing off. Patient to discuss leg wounds with wound care and reconsult podiatry as needed

## 2020-02-26 NOTE — DISCHARGE NOTE PROVIDER - CARE PROVIDERS DIRECT ADDRESSES
,[email or web address],DirectAddress_Unknown,DirectAddress_Unknown,[email or web address]

## 2020-02-27 LAB
BASOPHILS # BLD AUTO: 0.07 K/UL — SIGNIFICANT CHANGE UP (ref 0–0.2)
BASOPHILS NFR BLD AUTO: 0.5 % — SIGNIFICANT CHANGE UP (ref 0–2)
EOSINOPHIL # BLD AUTO: 0.32 K/UL — SIGNIFICANT CHANGE UP (ref 0–0.5)
EOSINOPHIL NFR BLD AUTO: 2.4 % — SIGNIFICANT CHANGE UP (ref 0–6)
HCT VFR BLD CALC: 33.1 % — LOW (ref 34.5–45)
HGB BLD-MCNC: 10.2 G/DL — LOW (ref 11.5–15.5)
IMM GRANULOCYTES NFR BLD AUTO: 0.9 % — SIGNIFICANT CHANGE UP (ref 0–1.5)
LYMPHOCYTES # BLD AUTO: 16.3 % — SIGNIFICANT CHANGE UP (ref 13–44)
LYMPHOCYTES # BLD AUTO: 2.13 K/UL — SIGNIFICANT CHANGE UP (ref 1–3.3)
MCHC RBC-ENTMCNC: 27.9 PG — SIGNIFICANT CHANGE UP (ref 27–34)
MCHC RBC-ENTMCNC: 30.8 GM/DL — LOW (ref 32–36)
MCV RBC AUTO: 90.7 FL — SIGNIFICANT CHANGE UP (ref 80–100)
MONOCYTES # BLD AUTO: 1 K/UL — HIGH (ref 0–0.9)
MONOCYTES NFR BLD AUTO: 7.6 % — SIGNIFICANT CHANGE UP (ref 2–14)
NEUTROPHILS # BLD AUTO: 9.45 K/UL — HIGH (ref 1.8–7.4)
NEUTROPHILS NFR BLD AUTO: 72.3 % — SIGNIFICANT CHANGE UP (ref 43–77)
NRBC # BLD: 0 /100 WBCS — SIGNIFICANT CHANGE UP (ref 0–0)
PLATELET # BLD AUTO: 304 K/UL — SIGNIFICANT CHANGE UP (ref 150–400)
RBC # BLD: 3.65 M/UL — LOW (ref 3.8–5.2)
RBC # FLD: 16.4 % — HIGH (ref 10.3–14.5)
WBC # BLD: 13.09 K/UL — HIGH (ref 3.8–10.5)
WBC # FLD AUTO: 13.09 K/UL — HIGH (ref 3.8–10.5)

## 2020-02-27 PROCEDURE — 99232 SBSQ HOSP IP/OBS MODERATE 35: CPT

## 2020-02-27 RX ORDER — APIXABAN 2.5 MG/1
2.5 TABLET, FILM COATED ORAL EVERY 12 HOURS
Refills: 0 | Status: DISCONTINUED | OUTPATIENT
Start: 2020-02-27 | End: 2020-02-28

## 2020-02-27 RX ORDER — ACETAMINOPHEN 500 MG
2 TABLET ORAL
Qty: 0 | Refills: 0 | DISCHARGE
Start: 2020-02-27

## 2020-02-27 RX ORDER — APIXABAN 2.5 MG/1
2.5 TABLET, FILM COATED ORAL EVERY 12 HOURS
Refills: 0 | Status: DISCONTINUED | OUTPATIENT
Start: 2020-02-27 | End: 2020-02-27

## 2020-02-27 RX ADMIN — Medication 650 MILLIGRAM(S): at 14:36

## 2020-02-27 RX ADMIN — Medication 650 MILLIGRAM(S): at 15:06

## 2020-02-27 RX ADMIN — APIXABAN 2.5 MILLIGRAM(S): 2.5 TABLET, FILM COATED ORAL at 12:27

## 2020-02-27 RX ADMIN — Medication 40 MILLIGRAM(S): at 06:21

## 2020-02-27 RX ADMIN — Medication 400 MILLIGRAM(S): at 06:21

## 2020-02-27 RX ADMIN — Medication 400 MILLIGRAM(S): at 18:19

## 2020-02-27 RX ADMIN — APIXABAN 2.5 MILLIGRAM(S): 2.5 TABLET, FILM COATED ORAL at 18:19

## 2020-02-27 RX ADMIN — Medication 50 MILLIGRAM(S): at 06:21

## 2020-02-27 NOTE — PROGRESS NOTE ADULT - ASSESSMENT
89 F with PMH/PSH including AFib on Eliquis and Metoprolol, dementia, ?HF on furosemide, s/p double mastectomy for cystic breasts (1970s) presents to the ED sent in by podiatrist Dr. Linder and wound doctor Dr. Ferguson for gangrene of toes on the L foot  Leukocytosis, no fever  LLE 4th and 5th digit gangrene  L lateral calf ulcers--clean chronic venous stasis ulcers  Leukocytosis reactive to gangrene > active infection?  Residual WBC likely due to gangrene, doubt persistent infection  S/p treatment course for SSTI  Overall,  1) LE wound infection  - Continue off abx s/p course  - Monitor LE wounds for signs infection  - F/U podiatry  2) Gangrene  - Follow up vascular eval, any surgical planning?    Signing off. Please call with further questions or change in status.    Victor Hugo Gentile MD  Pager [phone]  After 5pm and on weekends call [phone]

## 2020-02-27 NOTE — PROGRESS NOTE ADULT - SUBJECTIVE AND OBJECTIVE BOX
CC: F/U for gangrene    Saw/spoke to patient. No fevers, no chills. No new complaints. Unchanged.    Allergies  penicillin (Hives)  sulfa drugs (Other)    ANTIMICROBIALS:  Off    PE:    Vital Signs Last 24 Hrs  T(C): 36.2 (27 Feb 2020 09:22), Max: 36.7 (26 Feb 2020 20:56)  T(F): 97.2 (27 Feb 2020 09:22), Max: 98.1 (27 Feb 2020 06:19)  HR: 71 (27 Feb 2020 09:22) (71 - 91)  BP: 149/86 (27 Feb 2020 09:22) (132/77 - 149/86)  RR: 18 (27 Feb 2020 09:22) (18 - 19)  SpO2: 97% (27 Feb 2020 09:22) (97% - 98%)    Gen: AOx3, NAD, non-toxic  CV: S1+S2 normal, nontachycardic  Resp: Clear bilat, no resp distress, no crackles/wheezes  Abd: Soft, nontender, +BS  Ext: LLE dry gangrene unchanged, wounds unchanged    LABS:                        10.2   13.09 )-----------( 304      ( 27 Feb 2020 07:14 )             33.1     MICROBIOLOGY:    .Blood Blood-Venous  02-21-20   No growth at 5 days.    (otherwise reviewed)    RADIOLOGY:    2/24 US:    IMPRESSION: This study is extremely limited as the pressures could not be obtained due to noncompressibility.    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    There is additional disease at the popliteal tibial level on the left and additional disease at the left trifurcation. Small vessel disease at the left foot is also noted.     There is additional disease seen at the right trifurcation level. Small vessel disease at the left foot is also noted.    Clinically warranted arterial duplex of the arteries of the lower extremities can be performed.

## 2020-02-27 NOTE — CHART NOTE - NSCHARTNOTEFT_GEN_A_CORE
follow up- pt is cleared by md for discharge home with home hospice; discussed discharge medication/instruction;  Rosaline Shook(NP)  [address], [phone]

## 2020-02-27 NOTE — PROGRESS NOTE ADULT - SUBJECTIVE AND OBJECTIVE BOX
Patient is a 89y old  Female who presents with a chief complaint of leg gangrene/ ulcers (26 Feb 2020 13:47)      SUBJECTIVE / OVERNIGHT EVENTS: Comfortable without new complaints. Family at bedside.   Review of Systems  chest pain no  palpitations no  sob no  nausea no  headache no    MEDICATIONS  (STANDING):  apixaban 2.5 milliGRAM(s) Oral every 12 hours  furosemide    Tablet 40 milliGRAM(s) Oral daily  metoprolol succinate ER 50 milliGRAM(s) Oral daily  pentoxifylline 400 milliGRAM(s) Oral two times a day    MEDICATIONS  (PRN):  acetaminophen   Tablet .. 650 milliGRAM(s) Oral every 6 hours PRN Temp greater or equal to 38.5C (101.3F), Mild Pain (1 - 3)      Vital Signs Last 24 Hrs  T(C): 36.2 (27 Feb 2020 09:22), Max: 36.7 (26 Feb 2020 20:56)  T(F): 97.2 (27 Feb 2020 09:22), Max: 98.1 (27 Feb 2020 06:19)  HR: 71 (27 Feb 2020 09:22) (71 - 91)  BP: 149/86 (27 Feb 2020 09:22) (132/77 - 149/86)  BP(mean): --  RR: 18 (27 Feb 2020 09:22) (18 - 19)  SpO2: 97% (27 Feb 2020 09:22) (97% - 98%)    PHYSICAL EXAM:  GENERAL: NAD, well-developed  HEAD:  Atraumatic, Normocephalic  EYES: EOMI, PERRLA, conjunctiva and sclera clear  NECK: Supple, No JVD  CHEST/LUNG: Clear to auscultation bilaterally; No wheeze  HEART: Regular rate and rhythm; No murmurs, rubs, or gallops  ABDOMEN: Soft, Nontender, Nondistended; Bowel sounds present  EXTREMITIES:  L leg with clean dressing  PSYCH confused  NEUROLOGY: non-focal  SKIN: No rashes or lesions    LABS:                        10.2   13.09 )-----------( 304      ( 27 Feb 2020 07:14 )             33.1                       RADIOLOGY & ADDITIONAL TESTS:    Imaging Personally Reviewed:    Consultant(s) Notes Reviewed:      Care Discussed with Consultants/Other Providers:

## 2020-02-27 NOTE — PROGRESS NOTE ADULT - ASSESSMENT
89 f with    Foot ulceration/ Gangrene  - wound care  - Vascular follow noted No intervention  - Podiatry follow. Family refusing toes amputation   - antibiotics  - ID follow  - family refuse angio and possible revascularization     Afib   - rate control  - continue AC    Dementia   - supportive care     HTN (hypertension)   -  control    PVD (peripheral vascular disease)  - Vascular evaluation noted    Hypokalemia  - supplement    d/w daughter at length     Hospice evaluation noted.    DCP home with hospice care in progress.      John Guajardo MD pager [phone]

## 2020-02-28 ENCOUNTER — TRANSCRIPTION ENCOUNTER (OUTPATIENT)
Age: 85
End: 2020-02-28

## 2020-02-28 VITALS
SYSTOLIC BLOOD PRESSURE: 142 MMHG | RESPIRATION RATE: 19 BRPM | OXYGEN SATURATION: 97 % | DIASTOLIC BLOOD PRESSURE: 84 MMHG | HEART RATE: 78 BPM | TEMPERATURE: 97 F

## 2020-02-28 PROCEDURE — 93005 ELECTROCARDIOGRAM TRACING: CPT

## 2020-02-28 PROCEDURE — 97530 THERAPEUTIC ACTIVITIES: CPT

## 2020-02-28 PROCEDURE — 80048 BASIC METABOLIC PNL TOTAL CA: CPT

## 2020-02-28 PROCEDURE — 83605 ASSAY OF LACTIC ACID: CPT

## 2020-02-28 PROCEDURE — 85014 HEMATOCRIT: CPT

## 2020-02-28 PROCEDURE — 96374 THER/PROPH/DIAG INJ IV PUSH: CPT

## 2020-02-28 PROCEDURE — 99238 HOSP IP/OBS DSCHRG MGMT 30/<: CPT

## 2020-02-28 PROCEDURE — 84443 ASSAY THYROID STIM HORMONE: CPT

## 2020-02-28 PROCEDURE — 82947 ASSAY GLUCOSE BLOOD QUANT: CPT

## 2020-02-28 PROCEDURE — 93923 UPR/LXTR ART STDY 3+ LVLS: CPT

## 2020-02-28 PROCEDURE — 82435 ASSAY OF BLOOD CHLORIDE: CPT

## 2020-02-28 PROCEDURE — 97161 PT EVAL LOW COMPLEX 20 MIN: CPT

## 2020-02-28 PROCEDURE — 85027 COMPLETE CBC AUTOMATED: CPT

## 2020-02-28 PROCEDURE — 84132 ASSAY OF SERUM POTASSIUM: CPT

## 2020-02-28 PROCEDURE — 85730 THROMBOPLASTIN TIME PARTIAL: CPT

## 2020-02-28 PROCEDURE — 85652 RBC SED RATE AUTOMATED: CPT

## 2020-02-28 PROCEDURE — 84295 ASSAY OF SERUM SODIUM: CPT

## 2020-02-28 PROCEDURE — 99285 EMERGENCY DEPT VISIT HI MDM: CPT | Mod: 25

## 2020-02-28 PROCEDURE — 80053 COMPREHEN METABOLIC PANEL: CPT

## 2020-02-28 PROCEDURE — 84145 PROCALCITONIN (PCT): CPT

## 2020-02-28 PROCEDURE — 82330 ASSAY OF CALCIUM: CPT

## 2020-02-28 PROCEDURE — 87040 BLOOD CULTURE FOR BACTERIA: CPT

## 2020-02-28 PROCEDURE — 82803 BLOOD GASES ANY COMBINATION: CPT

## 2020-02-28 PROCEDURE — 85610 PROTHROMBIN TIME: CPT

## 2020-02-28 PROCEDURE — 86140 C-REACTIVE PROTEIN: CPT

## 2020-02-28 PROCEDURE — 97116 GAIT TRAINING THERAPY: CPT

## 2020-02-28 PROCEDURE — 82607 VITAMIN B-12: CPT

## 2020-02-28 RX ADMIN — Medication 40 MILLIGRAM(S): at 05:55

## 2020-02-28 RX ADMIN — Medication 650 MILLIGRAM(S): at 09:16

## 2020-02-28 RX ADMIN — Medication 400 MILLIGRAM(S): at 05:55

## 2020-02-28 RX ADMIN — APIXABAN 2.5 MILLIGRAM(S): 2.5 TABLET, FILM COATED ORAL at 05:55

## 2020-02-28 RX ADMIN — Medication 50 MILLIGRAM(S): at 05:55

## 2020-02-28 RX ADMIN — Medication 650 MILLIGRAM(S): at 09:46

## 2020-02-28 NOTE — DISCHARGE NOTE NURSING/CASE MANAGEMENT/SOCIAL WORK - PATIENT PORTAL LINK FT
You can access the FollowMyHealth Patient Portal offered by [facility] by registering at the following website: [email or web address]. By joining [employer]’s FollowMyHealth portal, you will also be able to view your health information using other applications (apps) compatible with our system.

## 2020-02-28 NOTE — PROGRESS NOTE ADULT - REASON FOR ADMISSION
leg gangrene/ ulcers

## 2020-02-28 NOTE — PROGRESS NOTE ADULT - ASSESSMENT
89 f with    Foot ulceration/ Gangrene  - wound care  - Vascular follow noted No intervention  - Podiatry follow. Family refusing toes amputation   - antibiotics  - ID follow  - family refuse angio and possible revascularization     Afib   - rate control  - continue AC    Dementia   - supportive care     HTN (hypertension)   -  control    PVD (peripheral vascular disease)  - Vascular evaluation noted    Hypokalemia  - supplement    d/w daughter at length     Hospice evaluation noted.    DC home with hospice care in progress.      John Guajardo MD pager [phone]

## 2020-02-28 NOTE — PROGRESS NOTE ADULT - SUBJECTIVE AND OBJECTIVE BOX
Patient is a 89y old  Female who presents with a chief complaint of leg gangrene/ ulcers (27 Feb 2020 16:47)      SUBJECTIVE / OVERNIGHT EVENTS: Comfortable without new complaints.   Review of Systems  chest pain no  palpitations no  sob no  nausea no  headache no    MEDICATIONS  (STANDING):  apixaban 2.5 milliGRAM(s) Oral every 12 hours  furosemide    Tablet 40 milliGRAM(s) Oral daily  metoprolol succinate ER 50 milliGRAM(s) Oral daily  pentoxifylline 400 milliGRAM(s) Oral two times a day    MEDICATIONS  (PRN):  acetaminophen   Tablet .. 650 milliGRAM(s) Oral every 6 hours PRN Temp greater or equal to 38.5C (101.3F), Mild Pain (1 - 3)      Vital Signs Last 24 Hrs  T(C): 36.2 (28 Feb 2020 08:35), Max: 36.9 (27 Feb 2020 21:05)  T(F): 97.1 (28 Feb 2020 08:35), Max: 98.4 (27 Feb 2020 21:05)  HR: 78 (28 Feb 2020 08:35) (74 - 87)  BP: 142/84 (28 Feb 2020 08:35) (134/68 - 144/69)  BP(mean): --  RR: 19 (28 Feb 2020 08:35) (18 - 19)  SpO2: 97% (28 Feb 2020 08:35) (96% - 100%)    PHYSICAL EXAM:  GENERAL: NAD  HEAD:  Atraumatic, Normocephalic  EYES: EOMI, PERRLA, conjunctiva and sclera clear  NECK: Supple, No JVD  CHEST/LUNG: Clear to auscultation bilaterally; No wheeze  HEART: Regular rate and rhythm; No murmurs, rubs, or gallops  ABDOMEN: Soft, Nontender, Nondistended; Bowel sounds present  EXTREMITIES:  L leg with clean dressing  PSYCH: confused  NEUROLOGY: non-focal  SKIN: No rashes or lesions    LABS:                        10.2   13.09 )-----------( 304      ( 27 Feb 2020 07:14 )             33.1                       RADIOLOGY & ADDITIONAL TESTS:    Imaging Personally Reviewed:    Consultant(s) Notes Reviewed:      Care Discussed with Consultants/Other Providers:

## 2020-02-28 NOTE — CHART NOTE - NSCHARTNOTEFT_GEN_A_CORE
follow up- pt is cleared by attending MD fro discharge home with home hospice; discussed discharge medication/follow up.  Rosaline Shook(NP)  [address], [phone]

## 2020-03-13 NOTE — DATA REVIEWED
[FreeTextEntry1] : pmh incontinence\par ptca 2003 and mastectomy 1990\par dementia\par previous ulcer of leg in 2012 4.5x5x.3 anterior leg, treated with collagenase, then medihoney, multiple debridements\par mackenzie 1.2 right left 1.2 feb 2012 had coumadin at the time grew staph aureus\par

## 2020-03-13 NOTE — HISTORY OF PRESENT ILLNESS
[FreeTextEntry1] : Ms. LOTTE BLOCH   presents to the office with a wound for 3 months  duration.\par   The wound is located on  the left calf .  \par The patient has complaints of pain and drainage. \par  The patient has been dressing the wound with neopsporin. \par The patient denies fevers or chills.\par   The patient has localized pain to the wound upon dressing changes.  The patient has no other complaints or associated symptoms. \par  The pt. had fallen approx. 3 months ago was initially taken to [facility], then followed up with primary care who placed on doxy., just started 2nd course of it.\par \par \par

## 2020-03-13 NOTE — PHYSICAL EXAM
[Normal Breath Sounds] : Normal breath sounds [Normal Rate and Rhythm] : normal rate and rhythm [1+] : left 1+ [Ankle Swelling (On Exam)] : present [] : present [Skin Ulcer] : ulcer [Alert] : alert [JVD] : no jugular venous distention  [Abdomen Tenderness] : ~T ~M No abdominal tenderness [Oriented to Person] : disoriented to person [Oriented to Place] : disoriented to place [de-identified] : nad awake confused with 2 daugthers [de-identified] : incontinent [de-identified] : rom intact knees, in wc [de-identified] : foul smelling [de-identified] : pus [FreeTextEntry5] : scalpel #15 [de-identified] : posterior malleolus left [de-identified] : 0.7 [de-identified] : 0.8 [de-identified] : 0.3 [FreeTextEntry1] : 4 th lateral toe web [FreeTextEntry2] : 1 [FreeTextEntry3] : 1 [FreeTextEntry4] : 0.2 [de-identified] : infected [de-identified] : betadine [de-identified] : washed with chlorohexidine [FreeTextEntry7] : 5 th toe left [FreeTextEntry8] : 1.5 [FreeTextEntry9] : 1 [de-identified] : 0.3 [de-identified] : gangrenous [de-identified] : debrided, necrotic [FreeTextEntry6] : portion excised and sent for path [de-identified] : betadine [de-identified] : washed with chlorohexidine [de-identified] : 50% [de-identified] : 2.5% Lidocaine Topical [de-identified] : 100% [de-identified] : Left [de-identified] : Pressure [de-identified] : Mechanical [TWNoteComboBox1] : Left [TWNoteComboBox5] : Yes [TWNoteComboBox6] : Arterial [de-identified] : Macerated [de-identified] : Moderate [de-identified] : 100% [de-identified] : Yes [TWNoteComboBox7] : Mechanical [TWNoteComboBox9] : Left [de-identified] : Arterial [de-identified] : Macerated [de-identified] : Moderate [de-identified] : 100% [de-identified] : Bone [de-identified] : 2.5% Lidocaine Topical [TWNoteComboBox8] : Citlali [de-identified] : Debridement non-excisional

## 2020-03-13 NOTE — ASSESSMENT
[FreeTextEntry1] :  \par 89 yr. old female CAD   on elaquis, HTN\par with necrotic, foul smelling wounds to her left calf and toes. 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 
breath sounds clear and equal bilaterally.

## 2020-03-19 NOTE — HISTORY OF PRESENT ILLNESS
[FreeTextEntry1] : Ms. LOTTE BLOCH   presents to the office with a wound for 3 months  duration.  \par had been seen using dakins wet to dry and gent on wounds\par \par path positive for acute osteo\par did not see id\par pt sometimes non- compliant\par \par \par culture positive for ecoli and enterococus faecalis\par crushing the antibiotic cipro and flagyl\par The wounds are is located on  the left calf  and 5th toe. Portion of toe bone sent for r/o osteomyelitis, gangrenous 5th toe , previous mackenzie 1.2 in 2012 , probable pad now at age  89\par \par .  The patient has complaints of pain and drainage. \par  The patient previouslywith neopsporin. The patient denies fevers or chills.  \par The patient has localized pain to the wound upon dressing changes. \par  The patient has no other complaints or associated symptoms.  The pt. had fallen approx. 3 months ago was initially taken to [facility], then followed up with primary care who placed on doxy., just started 2nd course of it.\par \par

## 2020-03-19 NOTE — PHYSICAL EXAM
[Normal Breath Sounds] : Normal breath sounds [Normal Rate and Rhythm] : normal rate and rhythm [4 x 4] : 4 x 4  [JVD] : no jugular venous distention  [de-identified] : nad [de-identified] : infected [de-identified] : posterior malleolus left [de-identified] : .6 [de-identified] : .9 [de-identified] : 0.5 [de-identified] : gent [de-identified] : .7x.8 bruise dti 5th mt mid [FreeTextEntry1] : 4 th lateral toe web [FreeTextEntry2] : 1 [FreeTextEntry3] : 0.9 [FreeTextEntry4] : 0.2 [de-identified] : iodorb [de-identified] : 1/1/0.2 [FreeTextEntry7] : 5 th toe left [FreeTextEntry8] : 2.5 [FreeTextEntry9] : 3.5 [de-identified] : 0.3 [de-identified] : portion of necrotic bone removed [FreeTextEntry6] : toe cyanotic/gangrenous [de-identified] : betadine [de-identified] : 1.5/1/0.3 [TWNoteComboBox5] : No [TWNoteComboBox6] : Traumatic [de-identified] : Normal [de-identified] : None [de-identified] : <20% [de-identified] : <20% [de-identified] : Yes [de-identified] : 2.5% Lidocaine Topical [de-identified] : Arterial [de-identified] : Mild [de-identified] : 50% [de-identified] : <20% [de-identified] : Yes [de-identified] : 2.5% Lidocaine Topical [de-identified] : Left [de-identified] : Traumatic [de-identified] : Yes [de-identified] : Mechanical [TWNoteComboBox1] : Left [de-identified] : 100% [de-identified] : Bone [TWNoteComboBox7] : Mechanical [TWNoteComboBox9] : Left [de-identified] : 100% [de-identified] : Bone [TWNoteComboBox8] : Citlali [de-identified] : Debridement non-excisional

## 2020-03-19 NOTE — ASSESSMENT
[FreeTextEntry1] : 89 yr old woman CAD HTN on AC with necrotic gangrenous left 5th toe, medial and lateral left leg ulcers on ac afib, htn and s/p cad with stents\par now 4th toe completely ischemic\par discussed with podiatry\par recommend  er admit and vascular assessment\par \par less pain and drainage.  \par denies fevers or chills. \par did not get labs for renal function\par gent on wounds and dakins wet to dry\par \par \par 2.21.2020\par worse necrotic, foul smelling wounds  on 4th toe  \par left calf wounds improved \par path for 5th toe with osteo \par \par Seen by  Podiatrist- wound care Podiatrist today.\par Recommendation-  report to [facility] for assessment/evaluation to R/O Gangrene.

## 2020-05-14 NOTE — DISCHARGE NOTE PROVIDER - REASON FOR ADMISSION
leg gangrene/ ulcers O-L Flap Text: The defect edges were debeveled with a #15 scalpel blade.  Given the location of the defect, shape of the defect and the proximity to free margins an O-L flap was deemed most appropriate.  Using a sterile surgical marker, an appropriate advancement flap was drawn incorporating the defect and placing the expected incisions within the relaxed skin tension lines where possible.    The area thus outlined was incised deep to adipose tissue with a #15 scalpel blade.  The skin margins were undermined to an appropriate distance in all directions utilizing iris scissors.

## 2021-01-10 ENCOUNTER — FORM ENCOUNTER (OUTPATIENT)
Age: 86
End: 2021-01-10

## 2021-01-11 ENCOUNTER — FORM ENCOUNTER (OUTPATIENT)
Age: 86
End: 2021-01-11

## 2021-01-25 ENCOUNTER — APPOINTMENT (OUTPATIENT)
Dept: HOME HEALTH SERVICES | Facility: HOME HEALTH | Age: 86
End: 2021-01-25
Payer: MEDICARE

## 2021-01-25 DIAGNOSIS — L97.923: ICD-10-CM

## 2021-01-25 DIAGNOSIS — I96 GANGRENE, NOT ELSEWHERE CLASSIFIED: ICD-10-CM

## 2021-01-25 DIAGNOSIS — L97.929 NON-PRESSURE CHRONIC ULCER OF UNSPECIFIED PART OF LEFT LOWER LEG WITH UNSPECIFIED SEVERITY: ICD-10-CM

## 2021-01-25 DIAGNOSIS — Z87.828 PERSONAL HISTORY OF OTHER (HEALED) PHYSICAL INJURY AND TRAUMA: ICD-10-CM

## 2021-01-25 DIAGNOSIS — L97.509 NON-PRESSURE CHRONIC ULCER OF OTHER PART OF UNSPECIFIED FOOT WITH UNSPECIFIED SEVERITY: ICD-10-CM

## 2021-01-25 PROBLEM — F03.90 UNSPECIFIED DEMENTIA WITHOUT BEHAVIORAL DISTURBANCE: Chronic | Status: ACTIVE | Noted: 2020-02-21

## 2021-01-25 PROBLEM — I48.91 UNSPECIFIED ATRIAL FIBRILLATION: Chronic | Status: ACTIVE | Noted: 2020-02-21

## 2021-01-25 PROBLEM — I10 ESSENTIAL (PRIMARY) HYPERTENSION: Chronic | Status: ACTIVE | Noted: 2020-02-21

## 2021-01-25 PROBLEM — I73.9 PERIPHERAL VASCULAR DISEASE, UNSPECIFIED: Chronic | Status: ACTIVE | Noted: 2020-02-21

## 2021-01-25 PROCEDURE — 99345 HOME/RES VST NEW HIGH MDM 75: CPT | Mod: 25,95

## 2021-01-25 PROCEDURE — G0506: CPT | Mod: 95

## 2021-01-25 RX ORDER — SILVER 2" X 2"
0.9 BANDAGE TOPICAL
Qty: 1 | Refills: 1 | Status: DISCONTINUED | COMMUNITY
Start: 2020-02-13 | End: 2021-01-25

## 2021-01-25 RX ORDER — METRONIDAZOLE 500 MG/1
500 TABLET ORAL TWICE DAILY
Qty: 14 | Refills: 0 | Status: DISCONTINUED | COMMUNITY
Start: 2020-02-06 | End: 2021-01-25

## 2021-01-25 RX ORDER — SODIUM HYPOCHLORITE 1.25 MG/ML
0.12 SOLUTION TOPICAL
Qty: 1 | Refills: 1 | Status: DISCONTINUED | COMMUNITY
Start: 2020-02-06 | End: 2021-01-25

## 2021-01-25 RX ORDER — METOPROLOL TARTRATE 50 MG/1
50 TABLET, FILM COATED ORAL
Refills: 0 | Status: DISCONTINUED | COMMUNITY
End: 2021-01-25

## 2021-01-25 RX ORDER — DOXYCYCLINE HYCLATE 100 MG/1
100 CAPSULE ORAL
Refills: 0 | Status: DISCONTINUED | COMMUNITY
End: 2021-01-25

## 2021-01-25 RX ORDER — GENTAMICIN SULFATE 1 MG/G
0.1 OINTMENT TOPICAL DAILY
Qty: 2 | Refills: 2 | Status: DISCONTINUED | COMMUNITY
Start: 2020-02-06 | End: 2021-01-25

## 2021-01-25 RX ORDER — CIPROFLOXACIN HYDROCHLORIDE 500 MG/1
500 TABLET, FILM COATED ORAL
Qty: 14 | Refills: 0 | Status: DISCONTINUED | COMMUNITY
Start: 2020-02-06 | End: 2021-01-25

## 2021-01-25 NOTE — COUNSELING
[Overweight - ( BMI 25.1 - 29.9 )] : overweight -  ( BMI 25.1 - 29.9 ) [Continue diet as tolerated] : continue diet as tolerated based on goals of care [Non - Smoker] : non-smoker [Smoke/CO Detectors] : smoke/CO detectors [Use assistive device to avoid falls] : use assistive device to avoid falls [] : foot exam [Not Recommended] : Aspirin use not recommended due to overall prognosis [Decrease hospital use] : decrease hospital use [Comfort Care] : comfort care [Maintain functional ability] : maintain functional ability [Likely to achieve goals/desired outcomes] : likely to achieve goals/desired outcomes [Patient/Caregiver has ___ understanding of disease process] : patient/caregiver has [unfilled] understanding of disease process [Advanced Directives discussed: ____] : Advanced directives discussed: [unfilled] [Completed Medical Orders for Life-Sustaining Treatment] : completed medical orders for life-sustaining treatment [DNR] : Code Status: DNR [Comfort] : Treatment Guidelines: Comfort [DNI] : Intubation: DNI [[facility]: _____[facility]last [facility] [unfilled] [_____] : HCP: [unfilled] [ - New patient with 2 or more chronic conditions; CCM discussed and patient-centered care plan established] : New patient with 2 or more chronic conditions; CCM discussed and patient-centered care plan established

## 2021-02-03 NOTE — PHYSICAL EXAM
[No Acute Distress] : no acute distress [Well Nourished] : well nourished [Well Developed] : well developed [Normal Sclera/Conjunctiva] : normal sclera/conjunctiva [Normal Outer Ear/Nose] : the ears and nose were normal in appearance [Supple] : the neck was supple [No Respiratory Distress] : no respiratory distress [No Accessory Muscle Use] : no accessory muscle use [No Edema] : there was no peripheral edema [Not Distended] : not distended [No Joint Swelling] : no joint swelling seen [No Rash] : no rash [No Skin Lesions] : no skin lesions [No Gross Sensory Deficits] : no gross sensory deficits [Normal Affect] : the affect was normal [Normal Mood] : the mood was normal [de-identified] : requires assistance to ambulate

## 2021-02-03 NOTE — HISTORY OF PRESENT ILLNESS
[Family Member] : family member [FreeTextEntry1] : dementia  [FreeTextEntry2] : LOTTE BLOCH is being seen for a visit provided via telehealth via [employer] This visit was first attempted using [employer] telehealth real-time audio visual technology, but was unable to be completed.\par LOTTE BLOCH was located at their home, [id number] [place] [address]\par [address]\[place] [address], at the time of the visit.\par The [facility] clinician, EILEEN PEREZ, was located remotely at their home in [place] at the time of the visit. \par The patient, LOTTE BLOCH, and the [facility] clinician, EILEEN PEREZ, participated in the telehealth encounter.\par Other participants included: twin daughters \par LOTTE BLOCH (Nov 15 1923) or his/her representative consents to the use of telehealth. All questions related to telehealth answered\par  \par  \par \par 89 yo F from [place], recent hospice on 12/15/2020, w/ PMHx Holocaust survivor, dementia, afib (on ), CAD s/p cardiac stents in , PVD, toe gangrene s/p 2 toe amputations, leg wounds now healed, s/p double mastectomy for cystic breast (), seen for initial visit. \par \par Last hospitalization (-20): a/w gangrene on the left toes s/p abx, family opting for conservative management and d/c'ed home w/ hospice \par \par Had leg abrasions on lower left leg - about 3 weeks ago healed w/ xeroform, uses inzo daily now that it is healed, in past bas used inzo barrier cream, xeroform, skintegrity hydrogel, A&D ointment, gauze and wraps for wounds  \par \par Arthritis: adama in hand in back, rare, relieved d by tylenol prn \par \par Appetite/dysphagia/weight: Good. No dysphagia. Has native teeth. Eats regular food. Has lost ~15 pounds over past year. \par Chronic constipation: present for years, Only takes PRN, does not like prunes  \par Sleep: Good. Sleeps as well during day. \par Mood: Has sundowning, sometimes agitated, sometimes has visiual hallucinations (ie., sees  family members), becomes belligerent on a daily basis, sometimes responds to redirecion, has been on paxil in remote past, but family does not recall if effective. In past, seroquel has increased agitation. Will tiral celexa \par Skin: no issues \par Gait/Falls: can take several steps with assistance. Fell in 2020 and fractured 2 ribs. Denies pain at this time \par DME: Uses w/c when out of bed, has hospital bed.  \par \par chronic depression/anxiety\par +Intermittent SOB - not an issue since in w/c \par +Intermittent foot swelling \par \par No cough, vomiting, fever, pain. , \par \par Uses commode and adult diapers, though usually continent of stool and urime \par has 2 adult dtrs - twins \par  private live-in HHAs \par Used to be followed [facility] \par Dtrs request only labs PRN based on symptoms as patient has difficult veins

## 2021-02-03 NOTE — HEALTH RISK ASSESSMENT
[HRA Reviewed] : Health risk assessment reviewed [Full assistance needed] : managing finances [Any fall with injury in past year] : Patient reported fall with injury in the past year [Yes] : The patient does have visual impairment [[employer]Up[employer]] : n/a  [de-identified] : only uses reading glasses

## 2021-02-03 NOTE — REASON FOR VISIT
[Initial Evaluation] : an initial evaluation [Family Member] : family member [Pre-Visit Preparation] : pre-visit preparation was done [Intercurrent Specialty/Sub-specialty Visits] : the patient has no intercurrent specialty/sub-specialty visits [FreeTextEntry1] : dementia

## 2021-02-04 ENCOUNTER — NON-APPOINTMENT (OUTPATIENT)
Age: 86
End: 2021-02-04

## 2021-02-05 ENCOUNTER — MED ADMIN CHARGE (OUTPATIENT)
Age: 86
End: 2021-02-05

## 2021-02-05 ENCOUNTER — APPOINTMENT (OUTPATIENT)
Dept: HOME HEALTH SERVICES | Facility: HOME HEALTH | Age: 86
End: 2021-02-05
Payer: MEDICARE

## 2021-02-05 PROCEDURE — 99349 HOME/RES VST EST MOD MDM 40: CPT | Mod: 25

## 2021-02-05 PROCEDURE — G0439: CPT

## 2021-02-08 ENCOUNTER — NON-APPOINTMENT (OUTPATIENT)
Age: 86
End: 2021-02-08

## 2021-02-16 ENCOUNTER — NON-APPOINTMENT (OUTPATIENT)
Age: 86
End: 2021-02-16

## 2021-02-18 VITALS
HEART RATE: 64 BPM | TEMPERATURE: 98.8 F | DIASTOLIC BLOOD PRESSURE: 60 MMHG | SYSTOLIC BLOOD PRESSURE: 122 MMHG | OXYGEN SATURATION: 95 % | RESPIRATION RATE: 16 BRPM

## 2021-02-18 NOTE — PHYSICAL EXAM
[No Acute Distress] : no acute distress [Well Nourished] : well nourished [Well Developed] : well developed [Normal Sclera/Conjunctiva] : normal sclera/conjunctiva [Normal Outer Ear/Nose] : the ears and nose were normal in appearance [Supple] : the neck was supple [No Respiratory Distress] : no respiratory distress [No Accessory Muscle Use] : no accessory muscle use [No Edema] : there was no peripheral edema [Not Distended] : not distended [No Joint Swelling] : no joint swelling seen [No Rash] : no rash [No Skin Lesions] : no skin lesions [No Gross Sensory Deficits] : no gross sensory deficits [Normal Affect] : the affect was normal [Normal Mood] : the mood was normal [Clear to Auscultation] : lungs were clear to auscultation bilaterally [Normal Rate] : heart rate was normal  [Regular Rhythm] : with a regular rhythm [Normal S1, S2] : normal S1 and S2 [de-identified] : requires assistance to ambulate

## 2021-02-18 NOTE — HEALTH RISK ASSESSMENT
[HRA Reviewed] : Health risk assessment reviewed [Full assistance needed] : managing medications [Any fall with injury in past year] : Patient reported fall with injury in the past year [Yes] : The patient does have visual impairment [[employer]GetUpGo] : n/a  [de-identified] : only uses reading glasses

## 2021-02-18 NOTE — HISTORY OF PRESENT ILLNESS
[Family Member] : family member [FreeTextEntry1] : dementia  [FreeTextEntry2] : Patient denies fever, cough, trouble breathing, rash and vomiting. Patient has not been in close contact with someone who is COVID positive. N95 mask, gloves and eye wear worn during visit: Y \par Total face to face time with patient: 15 minutes. \par  \par 89 yo F from [facility], recent hospice on 12/15/2020, w/ PMHx Holocaust survivor, dementia, afib (on eilqiuis), CAD s/p cardiac stents in , PVD, toe gangrene s/p 2 toe amputations, leg wounds now healed, s/p double mastectomy for cystic breast ()\par \par flu and PNA shots given today\par \par Had leg abrasions on lower left leg -  healed, in past bas used inzo barrier cream, xeroform, skintegrity hydrogel, A&D ointment, gauze and wraps for wounds  \par \par Arthritis: adama in hand in back, rare, relieved d by tylenol prn \par \par Appetite/dysphagia/weight: Good. No dysphagia. Has native teeth. Eats regular food. Has lost ~15 pounds over past year. \par Chronic constipation: present for years, Only takes PRN, does not like prunes  \par Sleep: Good. Sleeps as well during day. \par Mood: Has sundowning, sometimes agitated, sometimes has visiual hallucinations (ie., sees  family members), becomes belligerent on a daily basis, sometimes responds to redirecion, has been on paxil in remote past, but family does not recall if effective. In past, seroquel has increased agitation. Will trial celexa. Dtrs have not yet picked up from pharmacy\par Skin: no issues \par Gait/Falls: can take several steps with assistance. Fell in 2020 and fractured 2 ribs. Denies pain at this time \par DME: Uses w/c when out of bed, has hospital bed.  \par \par chronic depression/anxiety\par +Intermittent SOB - not an issue since in w/c \par +Intermittent foot swelling \par \par No cough, vomiting, fever, pain. , \par \par Uses commode and adult diapers, though usually continent of stool and urime \par has 2 adult dtrs - twins \par  private live-in HHAs \par Used to be [facility] \par Dtrs request only labs PRN based on symptoms as patient has difficult veins

## 2021-02-18 NOTE — COUNSELING
[Overweight - ( BMI 25.1 - 29.9 )] : overweight -  ( BMI 25.1 - 29.9 ) [Continue diet as tolerated] : continue diet as tolerated based on goals of care [Non - Smoker] : non-smoker [Smoke/CO Detectors] : smoke/CO detectors [Use assistive device to avoid falls] : use assistive device to avoid falls [] : eye exam [Not Recommended] : Aspirin use not recommended due to overall prognosis [Decrease hospital use] : decrease hospital use [Comfort Care] : comfort care [Maintain functional ability] : maintain functional ability [Patient/Caregiver has ___ understanding of disease process] : patient/caregiver has [unfilled] understanding of disease process [Likely to achieve goals/desired outcomes] : likely to achieve goals/desired outcomes [Advanced Directives discussed: ____] : Advanced directives discussed: [unfilled] [Completed Medical Orders for Life-Sustaining Treatment] : completed medical orders for life-sustaining treatment [DNR] : Code Status: DNR [Comfort] : Treatment Guidelines: Comfort [[facility]: _____[facility]last [facility] [[facility]filled] [DNI] : Intubation: DNI [_____] : HCP: [unfilled] [Established patient, extensive review of history, medical and functional status, risk factors and patient education] : Established patient, extensive review of history, medical and functional status, risk factors and patient education and counseling provided for subsequent annual wellness visit

## 2021-02-19 ENCOUNTER — NON-APPOINTMENT (OUTPATIENT)
Age: 86
End: 2021-02-19

## 2021-02-19 RX ORDER — CITALOPRAM HYDROBROMIDE 20 MG/1
20 TABLET, FILM COATED ORAL
Qty: 60 | Refills: 3 | Status: DISCONTINUED | COMMUNITY
Start: 2021-01-25 | End: 2021-02-19

## 2021-02-24 NOTE — PROGRESS NOTE ADULT - I WAS PHYSICALLY PRESENT FOR THE KEY PORTIONS OF THE EVALUATION AND MANAGEMENT (E/M) SERVICE PROVIDED.  I AGREE WITH THE ABOVE HISTORY, PHYSICAL, AND PLAN WHICH I HAVE REVIEWED AND EDITED WHERE APPROPRIATE
Statement Selected Alternatives Discussed Intro (Do Not Add Period): I discussed alternative treatments to Mohs surgery and specifically discussed the risks and benefits of

## 2021-03-05 ENCOUNTER — APPOINTMENT (OUTPATIENT)
Dept: HOME HEALTH SERVICES | Facility: HOME HEALTH | Age: 86
End: 2021-03-05

## 2021-03-09 ENCOUNTER — APPOINTMENT (OUTPATIENT)
Dept: HOME HEALTH SERVICES | Facility: HOME HEALTH | Age: 86
End: 2021-03-09

## 2021-03-10 ENCOUNTER — TRANSCRIPTION ENCOUNTER (OUTPATIENT)
Age: 86
End: 2021-03-10

## 2021-03-10 ENCOUNTER — NON-APPOINTMENT (OUTPATIENT)
Age: 86
End: 2021-03-10

## 2021-03-24 ENCOUNTER — NON-APPOINTMENT (OUTPATIENT)
Age: 86
End: 2021-03-24

## 2021-04-06 ENCOUNTER — NON-APPOINTMENT (OUTPATIENT)
Age: 86
End: 2021-04-06

## 2021-04-07 ENCOUNTER — NON-APPOINTMENT (OUTPATIENT)
Age: 86
End: 2021-04-07

## 2021-04-07 ENCOUNTER — APPOINTMENT (OUTPATIENT)
Dept: HOME HEALTH SERVICES | Facility: HOME HEALTH | Age: 86
End: 2021-04-07
Payer: MEDICARE

## 2021-04-07 VITALS — TEMPERATURE: 97.6 F | SYSTOLIC BLOOD PRESSURE: 108 MMHG | DIASTOLIC BLOOD PRESSURE: 68 MMHG

## 2021-04-07 PROCEDURE — 0031A: CPT

## 2021-04-08 ENCOUNTER — APPOINTMENT (OUTPATIENT)
Dept: HOME HEALTH SERVICES | Facility: HOME HEALTH | Age: 86
End: 2021-04-08

## 2021-04-08 VITALS
TEMPERATURE: 98.6 F | DIASTOLIC BLOOD PRESSURE: 70 MMHG | HEART RATE: 74 BPM | RESPIRATION RATE: 18 BRPM | SYSTOLIC BLOOD PRESSURE: 116 MMHG | OXYGEN SATURATION: 96 %

## 2021-05-11 ENCOUNTER — NON-APPOINTMENT (OUTPATIENT)
Age: 86
End: 2021-05-11

## 2021-05-12 ENCOUNTER — APPOINTMENT (OUTPATIENT)
Dept: HOME HEALTH SERVICES | Facility: HOME HEALTH | Age: 86
End: 2021-05-12
Payer: MEDICARE

## 2021-05-12 VITALS
HEART RATE: 77 BPM | TEMPERATURE: 97.3 F | RESPIRATION RATE: 16 BRPM | SYSTOLIC BLOOD PRESSURE: 131 MMHG | OXYGEN SATURATION: 98 % | DIASTOLIC BLOOD PRESSURE: 77 MMHG

## 2021-05-12 DIAGNOSIS — S91.109A UNSPECIFIED OPEN WOUND OF UNSPECIFIED TOE(S) W/OUT DAMAGE TO NAIL, INITIAL ENCOUNTER: ICD-10-CM

## 2021-05-12 DIAGNOSIS — R60.0 LOCALIZED EDEMA: ICD-10-CM

## 2021-05-12 PROCEDURE — 99349 HOME/RES VST EST MOD MDM 40: CPT

## 2021-05-12 RX ORDER — PENTOXIFYLLINE 400 MG/1
400 TABLET, EXTENDED RELEASE ORAL TWICE DAILY
Qty: 180 | Refills: 3 | Status: DISCONTINUED | COMMUNITY
Start: 2020-02-13 | End: 2021-05-12

## 2021-05-12 NOTE — COUNSELING
[Overweight - ( BMI 25.1 - 29.9 )] : overweight -  ( BMI 25.1 - 29.9 ) [Continue diet as tolerated] : continue diet as tolerated based on goals of care [Non - Smoker] : non-smoker [Smoke/CO Detectors] : smoke/CO detectors [Use assistive device to avoid falls] : use assistive device to avoid falls [] : foot exam [Not Recommended] : Aspirin use not recommended due to overall prognosis [Decrease hospital use] : decrease hospital use [Comfort Care] : comfort care [Maintain functional ability] : maintain functional ability [Likely to achieve goals/desired outcomes] : likely to achieve goals/desired outcomes [Patient/Caregiver has ___ understanding of disease process] : patient/caregiver has [unfilled] understanding of disease process [Advanced Directives discussed: ____] : Advanced directives discussed: [unfilled] [Completed Medical Orders for Life-Sustaining Treatment] : completed medical orders for life-sustaining treatment [DNR] : Code Status: DNR [Comfort] : Treatment Guidelines: Comfort [DNI] : Intubation: DNI [[facility]: _____] [facility]last verification date: [unfilled] [_____] : HCP: [unfilled]

## 2021-05-17 PROBLEM — R60.0 BILATERAL LOWER EXTREMITY EDEMA: Status: RESOLVED | Noted: 2021-01-25 | Resolved: 2021-05-17

## 2021-06-04 NOTE — REVIEW OF SYSTEMS
Vitals capture started with the following parameters, Patient=Adult, Interval=5 min, Initial Pressure=180 mmHg, Deflation Rate=5 mmHg, Cuff placed on Right Arm [Negative] : Heme/Lymph

## 2021-06-29 ENCOUNTER — APPOINTMENT (OUTPATIENT)
Dept: HOME HEALTH SERVICES | Facility: HOME HEALTH | Age: 86
End: 2021-06-29

## 2021-06-30 ENCOUNTER — NON-APPOINTMENT (OUTPATIENT)
Age: 86
End: 2021-06-30

## 2021-07-29 NOTE — ADDENDUM
[FreeTextEntry1] : Need for hospital bed: Ms. LOTTE BLOCH requires positioning of the body to alleviate pain and pressure in ways not feasible in an ordinary bedShe requires frequent changes in body position due to advanced dementia. She also requires the head of the bed to be elevated more than 30 degrees due to dementia and to avoid problems with aspiration.\par

## 2021-07-29 NOTE — PHYSICAL EXAM
[No Acute Distress] : no acute distress [Well Nourished] : well nourished [Well Developed] : well developed [Normal Sclera/Conjunctiva] : normal sclera/conjunctiva [Normal Outer Ear/Nose] : the ears and nose were normal in appearance [Supple] : the neck was supple [No Respiratory Distress] : no respiratory distress [No Accessory Muscle Use] : no accessory muscle use [No Edema] : there was no peripheral edema [Not Distended] : not distended [No Joint Swelling] : no joint swelling seen [No Rash] : no rash [No Skin Lesions] : no skin lesions [No Gross Sensory Deficits] : no gross sensory deficits [Normal Affect] : the affect was normal [Normal Mood] : the mood was normal [Clear to Auscultation] : lungs were clear to auscultation bilaterally [Normal Rate] : heart rate was normal  [Regular Rhythm] : with a regular rhythm [Normal S1, S2] : normal S1 and S2 [Non Tender] : non-tender [Soft] : abdomen soft [de-identified] : requires assistance to ambulate

## 2021-07-29 NOTE — HISTORY OF PRESENT ILLNESS
[Family Member] : family member [FreeTextEntry1] : dementia  [FreeTextEntry2] : Patient denies fever, cough, trouble breathing, rash and vomiting. Patient has not been in close contact with someone who is COVID positive. N95 mask, gloves and eye wear worn during visit: Y \par Total face to face time with patient: 30 minutes. \par  \par 89 yo F from [facility], recent hospice on 12/15/2020, w/ PMHx Holocaust survivor, dementia, afib (on eilqiuis), CAD s/p cardiac stents in , PVD, toe gangrene s/p 2 toe amputations, leg wounds now healed, s/p double mastectomy for cystic breast (). \par \par Arthritis: adama in hand in back, rare, relieved by tylenol prn \par \par Appetite/dysphagia/weight: Eats high-calorie foods at breakfast. Otherwise, doesn't eat much. No dysphagia. Has native teeth. Eats regular food. Weight has now stabilized\par Chronic constipation: present for years, Only takes PRN, does not like prunes  \par Sleep: Good. Sleeps as well during day. \par Mood: Has sundowning, sometimes agitated, agitation much approved, occasionally has visual hallucinations (ie., sees  family members),does not become agitated (i.e. thinks whose  passed away 20 years ago is still alive, recognizes daughters), has been on paxil in remote past, but family does not recall if effective. In past, seroquel has increased agitation. Was sleepy on celexa. \par Skin: no issues \par Gait/Falls: She has become weaker and needs assistance w/ transfer. can take several steps with assistance. Fell in 2020 and fractured 2 ribs. Denies pain at this time \par DME: Uses w/c when out of bed, has hospital bed.  \par \par Will stop pentoxifyline as does not currently have leg ulcers. If develops leg ulcers, may reconsider restarting. Has history of severe, long-healing leg ulcers. \par chronic depression/anxiety: declines medications \par +Intermittent foot swelling \par \par No cough, SOB, vomiting, fever, pain. , \par \par Uses commode and adult diapers, though usually continent of stool and urime \par has 2 adult dtrs - twins \par  private live-in HHAs \par Used to be [facility] \par Dtrs request only labs PRN based on symptoms as patient has difficult veins

## 2021-07-29 NOTE — HEALTH RISK ASSESSMENT
[HRA Reviewed] : Health risk assessment reviewed [Full assistance needed] : managing finances [Any fall with injury in past year] : Patient reported fall with injury in the past year [Yes] : The patient does have visual impairment [[employer]Up[employer]] : n/a  [de-identified] : only uses reading glasses

## 2021-08-24 ENCOUNTER — NON-APPOINTMENT (OUTPATIENT)
Age: 86
End: 2021-08-24

## 2021-09-08 ENCOUNTER — NON-APPOINTMENT (OUTPATIENT)
Age: 86
End: 2021-09-08

## 2021-09-09 ENCOUNTER — APPOINTMENT (OUTPATIENT)
Dept: HOME HEALTH SERVICES | Facility: HOME HEALTH | Age: 86
End: 2021-09-09
Payer: MEDICARE

## 2021-09-09 ENCOUNTER — MED ADMIN CHARGE (OUTPATIENT)
Age: 86
End: 2021-09-09

## 2021-09-09 DIAGNOSIS — Z23 ENCOUNTER FOR IMMUNIZATION: ICD-10-CM

## 2021-09-09 PROCEDURE — 90662 IIV NO PRSV INCREASED AG IM: CPT

## 2021-09-09 PROCEDURE — G0008: CPT

## 2021-09-09 PROCEDURE — 99349 HOME/RES VST EST MOD MDM 40: CPT | Mod: 25

## 2021-09-09 NOTE — COUNSELING
[Overweight - ( BMI 25.1 - 29.9 )] : overweight -  ( BMI 25.1 - 29.9 ) [Continue diet as tolerated] : continue diet as tolerated based on goals of care [Non - Smoker] : non-smoker [Smoke/CO Detectors] : smoke/CO detectors [Use assistive device to avoid falls] : use assistive device to avoid falls [] : foot exam [Not Recommended] : Aspirin use not recommended due to overall prognosis [Decrease hospital use] : decrease hospital use [Comfort Care] : comfort care [Maintain functional ability] : maintain functional ability [Likely to achieve goals/desired outcomes] : likely to achieve goals/desired outcomes [Patient/Caregiver has ___ understanding of disease process] : patient/caregiver has [unfilled] understanding of disease process [Advanced Directives discussed: ____] : Advanced directives discussed: [unfilled] [Completed Medical Orders for Life-Sustaining Treatment] : completed medical orders for life-sustaining treatment [DNR] : Code Status: DNR [Comfort] : Treatment Guidelines: Comfort [DNI] : Intubation: DNI [[facility]: _____] [facility]/last verification date: [unfilled] [_____] : HCP: [unfilled]

## 2021-09-17 PROBLEM — Z23 NEED FOR COVID-19 VACCINE: Status: RESOLVED | Noted: 2021-03-24 | Resolved: 2021-09-17

## 2021-09-17 NOTE — REASON FOR VISIT
[Family Member] : family member [Pre-Visit Preparation] : pre-visit preparation was done [Follow-Up] : a follow-up visit [Intercurrent Specialty/Sub-specialty Visits] : the patient has no intercurrent specialty/sub-specialty visits [FreeTextEntry1] : dementia

## 2021-09-17 NOTE — PHYSICAL EXAM
[No Acute Distress] : no acute distress [Well Nourished] : well nourished [Well Developed] : well developed [Normal Sclera/Conjunctiva] : normal sclera/conjunctiva [Normal Outer Ear/Nose] : the ears and nose were normal in appearance [Supple] : the neck was supple [No Respiratory Distress] : no respiratory distress [Clear to Auscultation] : lungs were clear to auscultation bilaterally [No Accessory Muscle Use] : no accessory muscle use [Normal Rate] : heart rate was normal  [Regular Rhythm] : with a regular rhythm [Normal S1, S2] : normal S1 and S2 [No Edema] : there was no peripheral edema [Non Tender] : non-tender [Soft] : abdomen soft [Not Distended] : not distended [No Joint Swelling] : no joint swelling seen [No Rash] : no rash [No Skin Lesions] : no skin lesions [No Gross Sensory Deficits] : no gross sensory deficits [Normal Affect] : the affect was normal [Normal Mood] : the mood was normal [de-identified] : requires assistance to ambulate

## 2021-09-17 NOTE — HISTORY OF PRESENT ILLNESS
[Family Member] : family member [FreeTextEntry1] : dementia  [FreeTextEntry2] : Patient denies fever, cough, trouble breathing, rash and vomiting. Patient has not been in close contact with someone who is COVID positive. N95 mask, gloves and eye wear worn during visit: Y \par Total face to face time with patient: 30 minutes. \par  \par 89 yo F from [facility], recent hospice on 12/15/2020, w/ PMHx Holocaust survivor, dementia, afib (on eilqiuis), CAD s/p cardiac stents in , PVD, toe gangrene s/p 2 toe amputations, leg wounds now healed, s/p double mastectomy for cystic breast (). \par \par Gave flu shot. \par \par Arthritis: adama in hand in back, rare, relieved by tylenol prn \par \par Appetite/dysphagia/weight: Eats high-calorie foods at breakfast. Otherwise, doesn't eat much. No dysphagia. Has native teeth. Eats regular food. Weight has now stabilized\par BMs: Every morning. \par Sleep: Good. Sleeps as well during day. \par Mood: Has sundowning, sometimes agitated, agitation much approved, occasionally has visual hallucinations (ie., sees  family members),does not become agitated (i.e. thinks whose  passed away 20 years ago is still alive, recognizes daughters), has been on paxil in remote past, but family does not recall if effective. In past, seroquel has increased agitation. Was sleepy on celexa. Per dtr, it's manageable. Not as comabtiveness. \par Skin: no issues \par Gait/Falls: She has become weaker and needs assistance w/ transfer. can take several steps with assistance. Fell in 2020 and fractured 2 ribs. Denies pain at this time \par DME: Uses w/c when out of bed, has hospital bed.  \par \par  does not currently have leg ulcers. If develops leg ulcers, may reconsider restarting. Has history of severe, long-healing leg ulcers. \par chronic depression/anxiety: declines medications \par +Intermittent foot swelling \par \par No cough, SOB, vomiting, fever, pain. , \par \par Uses commode and adult diapers, though usually continent of stool and urime \par has 2 adult dtrs - twins \par 24/7 private live-in HHAs \par Used to be [facility] \par Dtrs request only labs PRN based on symptoms as patient has difficult veins

## 2021-09-17 NOTE — HEALTH RISK ASSESSMENT
[facility] [HRA Reviewed] : Health risk assessment reviewed [Full assistance needed] : managing finances [Any fall with injury in past year] : Patient reported fall with injury in the past year [Yes] : The patient does have visual impairment [TimeGetUpGo] : n/a  [de-identified] : only uses reading glasses

## 2021-10-04 ENCOUNTER — NON-APPOINTMENT (OUTPATIENT)
Age: 86
End: 2021-10-04

## 2021-10-05 ENCOUNTER — LABORATORY RESULT (OUTPATIENT)
Age: 86
End: 2021-10-05

## 2021-10-06 ENCOUNTER — NON-APPOINTMENT (OUTPATIENT)
Age: 86
End: 2021-10-06

## 2021-10-06 LAB
ALBUMIN SERPL ELPH-MCNC: 3.8 G/DL
ALP BLD-CCNC: 131 U/L
ALT SERPL-CCNC: 15 U/L
ANION GAP SERPL CALC-SCNC: 12 MMOL/L
APPEARANCE: ABNORMAL
AST SERPL-CCNC: 24 U/L
BASOPHILS # BLD AUTO: 0.08 K/UL
BASOPHILS NFR BLD AUTO: 0.8 %
BILIRUB SERPL-MCNC: 0.4 MG/DL
BILIRUBIN URINE: NEGATIVE
BLOOD URINE: NEGATIVE
BUN SERPL-MCNC: 23 MG/DL
CALCIUM SERPL-MCNC: 9.5 MG/DL
CHLORIDE SERPL-SCNC: 100 MMOL/L
CO2 SERPL-SCNC: 26 MMOL/L
COLOR: YELLOW
CREAT SERPL-MCNC: 0.9 MG/DL
EOSINOPHIL # BLD AUTO: 0.37 K/UL
EOSINOPHIL NFR BLD AUTO: 3.9 %
GLUCOSE QUALITATIVE U: NEGATIVE
HCT VFR BLD CALC: 37.7 %
HGB BLD-MCNC: 11.9 G/DL
IMM GRANULOCYTES NFR BLD AUTO: 0.5 %
KETONES URINE: NEGATIVE
LEUKOCYTE ESTERASE URINE: ABNORMAL
LYMPHOCYTES # BLD AUTO: 2.18 K/UL
LYMPHOCYTES NFR BLD AUTO: 22.9 %
MAN DIFF?: NORMAL
MCHC RBC-ENTMCNC: 30 PG
MCHC RBC-ENTMCNC: 31.6 GM/DL
MCV RBC AUTO: 95 FL
MONOCYTES # BLD AUTO: 0.72 K/UL
MONOCYTES NFR BLD AUTO: 7.6 %
NEUTROPHILS # BLD AUTO: 6.1 K/UL
NEUTROPHILS NFR BLD AUTO: 64.3 %
NITRITE URINE: NEGATIVE
PH URINE: 7
PLATELET # BLD AUTO: 202 K/UL
POTASSIUM SERPL-SCNC: 3.9 MMOL/L
PROT SERPL-MCNC: 6.4 G/DL
PROTEIN URINE: NEGATIVE
RBC # BLD: 3.97 M/UL
RBC # FLD: 15.1 %
SODIUM SERPL-SCNC: 138 MMOL/L
SPECIFIC GRAVITY URINE: 1.01
UROBILINOGEN URINE: NORMAL
WBC # FLD AUTO: 9.5 K/UL

## 2021-10-07 ENCOUNTER — NON-APPOINTMENT (OUTPATIENT)
Age: 86
End: 2021-10-07

## 2021-10-11 LAB — BACTERIA UR CULT: ABNORMAL

## 2021-10-15 ENCOUNTER — NON-APPOINTMENT (OUTPATIENT)
Age: 86
End: 2021-10-15

## 2021-10-15 ENCOUNTER — TRANSCRIPTION ENCOUNTER (OUTPATIENT)
Age: 86
End: 2021-10-15

## 2021-10-16 ENCOUNTER — TRANSCRIPTION ENCOUNTER (OUTPATIENT)
Age: 86
End: 2021-10-16

## 2021-10-21 ENCOUNTER — NON-APPOINTMENT (OUTPATIENT)
Age: 86
End: 2021-10-21

## 2021-10-25 ENCOUNTER — NON-APPOINTMENT (OUTPATIENT)
Age: 86
End: 2021-10-25

## 2021-11-11 ENCOUNTER — NON-APPOINTMENT (OUTPATIENT)
Age: 86
End: 2021-11-11

## 2021-11-12 ENCOUNTER — LABORATORY RESULT (OUTPATIENT)
Age: 86
End: 2021-11-12

## 2021-11-12 ENCOUNTER — APPOINTMENT (OUTPATIENT)
Dept: HOME HEALTH SERVICES | Facility: HOME HEALTH | Age: 86
End: 2021-11-12

## 2021-11-12 VITALS
SYSTOLIC BLOOD PRESSURE: 128 MMHG | RESPIRATION RATE: 16 BRPM | HEART RATE: 72 BPM | DIASTOLIC BLOOD PRESSURE: 70 MMHG | TEMPERATURE: 97.8 F | OXYGEN SATURATION: 97 %

## 2021-11-12 RX ORDER — CIPROFLOXACIN HYDROCHLORIDE 250 MG/1
250 TABLET, FILM COATED ORAL
Qty: 6 | Refills: 0 | Status: COMPLETED | COMMUNITY
Start: 2021-10-07 | End: 2021-11-12

## 2021-11-15 ENCOUNTER — NON-APPOINTMENT (OUTPATIENT)
Age: 86
End: 2021-11-15

## 2021-11-23 ENCOUNTER — RX RENEWAL (OUTPATIENT)
Age: 86
End: 2021-11-23

## 2021-12-06 ENCOUNTER — NON-APPOINTMENT (OUTPATIENT)
Age: 86
End: 2021-12-06

## 2021-12-09 ENCOUNTER — APPOINTMENT (OUTPATIENT)
Dept: HOME HEALTH SERVICES | Facility: HOME HEALTH | Age: 86
End: 2021-12-09
Payer: MEDICARE

## 2021-12-09 VITALS
RESPIRATION RATE: 16 BRPM | SYSTOLIC BLOOD PRESSURE: 139 MMHG | HEART RATE: 79 BPM | TEMPERATURE: 97.1 F | DIASTOLIC BLOOD PRESSURE: 85 MMHG | OXYGEN SATURATION: 96 %

## 2021-12-09 PROCEDURE — 0064A: CPT

## 2021-12-17 ENCOUNTER — RX RENEWAL (OUTPATIENT)
Age: 86
End: 2021-12-17

## 2022-01-05 ENCOUNTER — NON-APPOINTMENT (OUTPATIENT)
Age: 87
End: 2022-01-05

## 2022-01-11 ENCOUNTER — APPOINTMENT (OUTPATIENT)
Dept: HOME HEALTH SERVICES | Facility: HOME HEALTH | Age: 87
End: 2022-01-11
Payer: MEDICARE

## 2022-01-11 VITALS
OXYGEN SATURATION: 97 % | DIASTOLIC BLOOD PRESSURE: 80 MMHG | TEMPERATURE: 98.9 F | SYSTOLIC BLOOD PRESSURE: 130 MMHG | RESPIRATION RATE: 16 BRPM | HEART RATE: 71 BPM

## 2022-01-11 DIAGNOSIS — R39.9 UNSPECIFIED SYMPTOMS AND SIGNS INVOLVING THE GENITOURINARY SYSTEM: ICD-10-CM

## 2022-01-11 DIAGNOSIS — I25.10 ATHEROSCLEROTIC HEART DISEASE OF NATIVE CORONARY ARTERY W/OUT ANGINA PECTORIS: ICD-10-CM

## 2022-01-11 PROCEDURE — 99349 HOME/RES VST EST MOD MDM 40: CPT

## 2022-01-11 NOTE — COUNSELING
[Overweight - ( BMI 25.1 - 29.9 )] : overweight -  ( BMI 25.1 - 29.9 ) [Continue diet as tolerated] : continue diet as tolerated based on goals of care [Non - Smoker] : non-smoker [Smoke/CO Detectors] : smoke/CO detectors [Use assistive device to avoid falls] : use assistive device to avoid falls [] : foot exam [Not Recommended] : Aspirin use not recommended due to overall prognosis [Decrease hospital use] : decrease hospital use [Comfort Care] : comfort care [Maintain functional ability] : maintain functional ability [Likely to achieve goals/desired outcomes] : likely to achieve goals/desired outcomes [Patient/Caregiver has ___ understanding of disease process] : patient/caregiver has [unfilled] understanding of disease process [Advanced Directives discussed: ____] : Advanced directives discussed: [unfilled] [Completed Medical Orders for Life-Sustaining Treatment] : completed medical orders for life-sustaining treatment [DNR] : Code Status: DNR [Comfort] : Treatment Guidelines: Comfort [DNI] : Intubation: DNI [Last [facility] Date: _____] : [facility]/last verification date: [unfilled] [_____] : HCP: [unfilled]

## 2022-01-18 PROBLEM — R39.9 URINARY TRACT INFECTION SYMPTOMS: Status: RESOLVED | Noted: 2021-11-12 | Resolved: 2022-01-18

## 2022-01-18 PROBLEM — I25.10 ATHEROSCLEROTIC HEART DISEASE OF NATIVE CORONARY ARTERY WITHOUT ANGINA PECTORIS: Status: ACTIVE | Noted: 2020-02-14

## 2022-01-18 NOTE — REASON FOR VISIT
[Follow-Up] : a follow-up visit [Family Member] : family member [Pre-Visit Preparation] : pre-visit preparation was done [Intercurrent Specialty/Sub-specialty Visits] : the patient has no intercurrent specialty/sub-specialty visits [FreeTextEntry1] : dementia

## 2022-01-18 NOTE — HISTORY OF PRESENT ILLNESS
[Family Member] : family member [FreeTextEntry1] : dementia  [FreeTextEntry2] : Patient denies fever, cough, trouble breathing, rash and vomiting. Patient has not been in close contact with someone who is COVID positive. N95 mask, gloves and eye wear worn during visit: Y \par Total face to face time with patient: 30 minutes. \par  \par 89 yo F from [place], recent hospice on 12/15/2020, w/ PMHx Holocaust survivor, dementia, afib (on eilqiuis), CAD s/p cardiac stents in , PVD, toe gangrene s/p 2 toe amputations, leg wounds now healed, s/p double mastectomy for cystic breast (). \par \par Arthritis: adama in hand in back, rare, relieved by tylenol prn \par Less conversational now. Will talk a lot about father and sisters, who have passed. \par Appetite/dysphagia/weight: Robust. Eats high-calorie foods at breakfast.. No dysphagia. Has native teeth. Eats regular food. Weight has now stabilized\par BMs: Every morning. \par Sleep: Sleeps during night. Sleeps as well during day. Sleeps from 8 AM to 1 PM. \par Mood: Has sundowning, sometimes agitated, agitation much approved, occasionally has visual hallucinations (ie., sees  family members),does not become agitated (i.e. thinks whose  passed away 20 years ago is still alive, recognizes daughters), has been on paxil in remote past, but family does not recall if effective. In past, seroquel has increased agitation. Was sleepy on celexa. Per dtr, it's manageable. Not as combative \par Skin: no issues \par Gait/Falls: She has become weaker and needs assistance w/ transfer. can take several steps with assistance. Fell in 2020 and fractured 2 ribs. Denies pain at this time \par DME: Uses w/c when out of bed, has hospital bed.  \par \par  does not currently have leg ulcers. If develops leg ulcers, may reconsider restarting. Has history of severe, long-healing leg ulcers. \par chronic depression/anxiety: declines medications \par +Intermittent foot swelling \par \par No cough, SOB, vomiting, fever, pain. , \par \par Uses commode and adult diapers, though usually continent of stool and urime \par has 2 adult dtrs - twins \par  private live-in HHAs \par Used to be [facility] \par Dtrs request only labs PRN based on symptoms as patient has difficult veins

## 2022-01-18 NOTE — PHYSICAL EXAM
[No Acute Distress] : no acute distress [Well Nourished] : well nourished [Well Developed] : well developed [Normal Sclera/Conjunctiva] : normal sclera/conjunctiva [Normal Outer Ear/Nose] : the ears and nose were normal in appearance [Supple] : the neck was supple [No Respiratory Distress] : no respiratory distress [Clear to Auscultation] : lungs were clear to auscultation bilaterally [No Accessory Muscle Use] : no accessory muscle use [Normal Rate] : heart rate was normal  [Regular Rhythm] : with a regular rhythm [Normal S1, S2] : normal S1 and S2 [No Edema] : there was no peripheral edema [Non Tender] : non-tender [Soft] : abdomen soft [Not Distended] : not distended [No Joint Swelling] : no joint swelling seen [No Rash] : no rash [No Skin Lesions] : no skin lesions [No Gross Sensory Deficits] : no gross sensory deficits [Normal Affect] : the affect was normal [Normal Mood] : the mood was normal [de-identified] : requires assistance to ambulate

## 2022-01-18 NOTE — HEALTH RISK ASSESSMENT
[HRA Reviewed] : Health risk assessment reviewed [Full assistance needed] : managing finances [Any fall with injury in past year] : Patient reported fall with injury in the past year [Yes] : The patient does have visual impairment [[employer]Up[employer]] : n/a  [de-identified] : only uses reading glasses

## 2022-02-08 ENCOUNTER — APPOINTMENT (OUTPATIENT)
Dept: HOME HEALTH SERVICES | Facility: HOME HEALTH | Age: 87
End: 2022-02-08

## 2022-02-14 ENCOUNTER — TRANSCRIPTION ENCOUNTER (OUTPATIENT)
Age: 87
End: 2022-02-14

## 2022-02-14 ENCOUNTER — NON-APPOINTMENT (OUTPATIENT)
Age: 87
End: 2022-02-14

## 2022-02-14 DIAGNOSIS — R53.83 OTHER FATIGUE: ICD-10-CM

## 2022-02-18 ENCOUNTER — NON-APPOINTMENT (OUTPATIENT)
Age: 87
End: 2022-02-18

## 2022-02-24 ENCOUNTER — APPOINTMENT (OUTPATIENT)
Dept: HOME HEALTH SERVICES | Facility: HOME HEALTH | Age: 87
End: 2022-02-24

## 2022-03-03 PROBLEM — R53.83 LETHARGY: Status: ACTIVE | Noted: 2022-03-03

## 2022-03-11 ENCOUNTER — NON-APPOINTMENT (OUTPATIENT)
Age: 87
End: 2022-03-11

## 2022-03-15 ENCOUNTER — TRANSCRIPTION ENCOUNTER (OUTPATIENT)
Age: 87
End: 2022-03-15

## 2022-04-15 ENCOUNTER — APPOINTMENT (OUTPATIENT)
Dept: HOME HEALTH SERVICES | Facility: HOME HEALTH | Age: 87
End: 2022-04-15

## 2022-04-20 ENCOUNTER — APPOINTMENT (OUTPATIENT)
Dept: HOME HEALTH SERVICES | Facility: HOME HEALTH | Age: 87
End: 2022-04-20
Payer: OTHER MISCELLANEOUS

## 2022-04-20 DIAGNOSIS — K59.09 OTHER CONSTIPATION: ICD-10-CM

## 2022-04-20 DIAGNOSIS — Z71.89 OTHER SPECIFIED COUNSELING: ICD-10-CM

## 2022-04-20 PROCEDURE — 99349 HOME/RES VST EST MOD MDM 40: CPT

## 2022-04-20 RX ORDER — METOPROLOL SUCCINATE 50 MG/1
50 TABLET, EXTENDED RELEASE ORAL DAILY
Qty: 90 | Refills: 3 | Status: DISCONTINUED | COMMUNITY
Start: 2021-01-25 | End: 2022-04-20

## 2022-04-20 NOTE — COUNSELING
[Continue diet as tolerated] : continue diet as tolerated based on goals of care [Overweight - ( BMI 25.1 - 29.9 )] : overweight -  ( BMI 25.1 - 29.9 ) [Non - Smoker] : non-smoker [Smoke/CO Detectors] : smoke/CO detectors [Use assistive device to avoid falls] : use assistive device to avoid falls [] : foot exam [Not Recommended] : Aspirin use not recommended due to overall prognosis [Decrease hospital use] : decrease hospital use [Comfort Care] : comfort care [Maintain functional ability] : maintain functional ability [Likely to achieve goals/desired outcomes] : likely to achieve goals/desired outcomes [Patient/Caregiver has ___ understanding of disease process] : patient/caregiver has [unfilled] understanding of disease process [Advanced Directives discussed: ____] : Advanced directives discussed: [unfilled] [Completed Medical Orders for Life-Sustaining Treatment] : completed medical orders for life-sustaining treatment [DNR] : Code Status: DNR [Comfort] : Treatment Guidelines: Comfort [DNI] : Intubation: DNI [[facility]: _____[facility] [[facility]filled] [_____] : HCP: [unfilled] [Established patient, extensive review of history, medical and functional status, risk factors and patient education] : Established patient, extensive review of history, medical and functional status, risk factors and patient education and counseling provided for subsequent annual wellness visit

## 2022-04-24 PROBLEM — Z71.89 ADVANCE CARE PLANNING: Status: ACTIVE | Noted: 2021-01-25

## 2022-04-24 PROBLEM — K59.09 CHRONIC CONSTIPATION: Status: ACTIVE | Noted: 2021-01-25

## 2022-04-24 NOTE — HEALTH RISK ASSESSMENT
[HRA Reviewed] : Health risk assessment reviewed [Full assistance needed] : managing finances [Any fall with injury in past year] : Patient reported fall with injury in the past year [Yes] : The patient does have visual impairment [[employer]Up[employer]] : n/a  [de-identified] : only uses reading glasses

## 2022-04-24 NOTE — REASON FOR VISIT
[Family Member] : family member [Pre-Visit Preparation] : pre-visit preparation was done [Subsequent Annual Medicare Wellness Visit] : a subsequent annual Medicare wellness visit [Intercurrent Specialty/Sub-specialty Visits] : the patient has no intercurrent specialty/sub-specialty visits [FreeTextEntry1] : dementia

## 2022-04-24 NOTE — PHYSICAL EXAM
[No Acute Distress] : no acute distress [Well Nourished] : well nourished [Well Developed] : well developed [Normal Sclera/Conjunctiva] : normal sclera/conjunctiva [Normal Outer Ear/Nose] : the ears and nose were normal in appearance [Supple] : the neck was supple [No Respiratory Distress] : no respiratory distress [Clear to Auscultation] : lungs were clear to auscultation bilaterally [No Accessory Muscle Use] : no accessory muscle use [Normal Rate] : heart rate was normal  [Regular Rhythm] : with a regular rhythm [Normal S1, S2] : normal S1 and S2 [No Edema] : there was no peripheral edema [Non Tender] : non-tender [Soft] : abdomen soft [Not Distended] : not distended [No Joint Swelling] : no joint swelling seen [No Rash] : no rash [No Gross Sensory Deficits] : no gross sensory deficits [Normal Affect] : the affect was normal [Normal Mood] : the mood was normal [de-identified] : requires assistance to ambulate  [de-identified] : Large unstageable pressure ulcer of right heel.

## 2022-04-24 NOTE — HISTORY OF PRESENT ILLNESS
[Family Member] : family member [FreeTextEntry1] : dementia  [FreeTextEntry2] : Patient denies fever, cough, trouble breathing, rash and vomiting. Patient has not been in close contact with someone who is COVID positive. N95 mask, gloves and eye wear worn during visit: Y \par Total face to face time with patient: 30 minutes. \par  \par 89 yo F from [place], recent hospice on 12/15/2020, w/ PMHx Holocaust survivor, dementia, afib (on eilqiuis), CAD s/p cardiac stents in , PVD, toe gangrene s/p 2 toe amputations, leg wounds now healed, s/p double mastectomy for cystic breast (). \par \par unstageable pressure ulcer: right heel: will order heel booties \par Arthritis: adama in hand in back, relieved by tylenol prn \par left lower leg swelling: try to elevate leg, on lasix \par Less conversational now. Will talk a lot about father and sisters, who have passed. \par Appetite/dysphagia/weight: Robust. Eats high-calorie foods at breakfast.. No dysphagia. Has native teeth. Eats regular food. Weight has now stabilized\par BMs: Every morning. \par Functional incontinence: Doesn't like using diapers. Unable to get to toilet or commode on time when has urge. \par Sleep: Sleeps during night. Sleeps as well during day. Sleeps from 8 AM to 1 PM. \par Mood: Has sundowning, sometimes agitated, much calmer more often now, agitation much approved, occasionally has visual hallucinations (ie., sees  family members),does not become agitated (i.e. thinks whose  passed away 20 years ago is still alive, recognizes daughters), has been on paxil in remote past, but family does not recall if effective. In past, seroquel has increased agitation. Was sleepy on celexa. Per dtr, it's manageable. Not as combative \par \par Gait/Falls: Uses zac lift now. Fell in 2020 and fractured 2 ribs. Denies pain at this time \par DME: Uses w/c when out of bed, has hospital bed.  \par \par  Has history of severe, long-healing leg ulcers. \par chronic depression/anxiety: declines medications \par +Intermittent foot swelling \par \par No cough, SOB, vomiting, fever, pain. , \par Getting a new electric bed today. \par has 2 adult dtrs - twins \par  private live-in HHAs \par Used to be [facility] \par Dtrs request only labs PRN based on symptoms as patient has difficult veins

## 2022-08-10 ENCOUNTER — APPOINTMENT (OUTPATIENT)
Dept: HOME HEALTH SERVICES | Facility: HOME HEALTH | Age: 87
End: 2022-08-10

## 2022-08-10 VITALS
TEMPERATURE: 97.2 F | RESPIRATION RATE: 16 BRPM | SYSTOLIC BLOOD PRESSURE: 106 MMHG | HEART RATE: 63 BPM | OXYGEN SATURATION: 97 % | DIASTOLIC BLOOD PRESSURE: 60 MMHG

## 2022-08-10 DIAGNOSIS — S81.802A UNSPECIFIED OPEN WOUND, LEFT LOWER LEG, INITIAL ENCOUNTER: ICD-10-CM

## 2022-08-10 PROCEDURE — 99349 HOME/RES VST EST MOD MDM 40: CPT

## 2022-08-10 NOTE — COUNSELING
[Overweight - ( BMI 25.1 - 29.9 )] : overweight -  ( BMI 25.1 - 29.9 ) [Continue diet as tolerated] : continue diet as tolerated based on goals of care [Non - Smoker] : non-smoker [Smoke/CO Detectors] : smoke/CO detectors [Use assistive device to avoid falls] : use assistive device to avoid falls [] : foot exam [Not Recommended] : Aspirin use not recommended due to overall prognosis [Decrease hospital use] : decrease hospital use [Comfort Care] : comfort care [Maintain functional ability] : maintain functional ability [Likely to achieve goals/desired outcomes] : likely to achieve goals/desired outcomes [Patient/Caregiver has ___ understanding of disease process] : patient/caregiver has [unfilled] understanding of disease process [Advanced Directives discussed: ____] : Advanced directives discussed: [unfilled] [Completed Medical Orders for Life-Sustaining Treatment] : completed medical orders for life-sustaining treatment [DNR] : Code Status: DNR [Comfort] : Treatment Guidelines: Comfort [DNI] : Intubation: DNI [[facility]: _____[facility]last [facility]: [unfilled] [_____] : HCP: [unfilled]

## 2022-08-14 PROBLEM — S81.802A WOUND OF LEFT LOWER EXTREMITY: Status: ACTIVE | Noted: 2022-08-14

## 2022-09-21 ENCOUNTER — APPOINTMENT (OUTPATIENT)
Dept: HOME HEALTH SERVICES | Facility: HOME HEALTH | Age: 87
End: 2022-09-21

## 2022-09-21 VITALS
RESPIRATION RATE: 16 BRPM | HEART RATE: 67 BPM | DIASTOLIC BLOOD PRESSURE: 72 MMHG | OXYGEN SATURATION: 99 % | SYSTOLIC BLOOD PRESSURE: 130 MMHG | TEMPERATURE: 97.6 F

## 2022-09-21 PROCEDURE — G0008: CPT

## 2022-09-21 PROCEDURE — 99347 HOME/RES VST EST SF MDM 20: CPT | Mod: 25

## 2022-09-21 PROCEDURE — 90662 IIV NO PRSV INCREASED AG IM: CPT

## 2022-09-23 NOTE — HISTORY OF PRESENT ILLNESS
[Family Member] : family member [FreeTextEntry1] : dementia  [FreeTextEntry2] : COVID-19 Screen - 08/10/2022 \par N95 mask, gloves, eyewear, and gown (if indicated) used during visit: Yes \par Patient or caregiver denies fever, cough, trouble breathing, rash, and vomiting. Patient has not been in close contact with anyone who is COVID-19 positive or suspected of having COVID-19. \par \par This is a 89 yo F from [place], recent hospice on 12/15/2020, w/ PMHx Holocaust survivor, dementia, afib (on eilqi), CAD s/p cardiac stents in , PVD, toe gangrene s/p 2 toe amputations, chronic lower extremity wound, s/p double mastectomy for cystic breast (). Patient being seen for a routine follow up for the management of chronic conditions. \par \par -Stage 3 pressure ulcer: Right heel. ~2 x 1 x 0.2 cm. Current wound care: wound cleanser, pat dry, apply hydrogel, cover with gauze, bhavani wrap.\par -Wound left shin: ~2 x 1 cm. Has history of severe, long-healing leg ulcers. Current wound care: wound cleanser, pat dry, apply xeroform gauze, cover with gauze, bhavani wrap.\par -Arthritis: adama in hand in back, relieved by Tylenol PRN.  \par -Left lower leg swelling: try to elevate leg, on Lasix. \par -Chronic depression/anxiety: declines medications \par \par Less conversational now. Will talk a lot about father and sisters, who have passed. \par \par Appetite/dysphagia/weight: Robust. Eats high-calorie foods at breakfast.. No dysphagia. Has native teeth. Eats regular food. Weight has now stabilized\par BMs: Every morning. \par Functional incontinence: Doesn't like using diapers. Unable to get to toilet or commode on time when has urge. \par Sleep: Sleeps during night. Sleeps as well during day. Sleeps from 8 AM to 1 PM. \par Mood: Has sundowning, sometimes agitated, much calmer more often now, agitation much approved, occasionally has visual hallucinations (ie., sees  family members),does not become agitated (i.e. thinks whose  passed away 20 years ago is still alive, recognizes daughters), has been on paxil in remote past, but family does not recall if effective. In past, seroquel has increased agitation. Was sleepy on celexa. Per dtr, it's manageable. Not as combative \par \par Gait/Falls: Uses Kathi lift now. Fell in 2020 and fractured 2 ribs. Denies pain at this time \par \par DME: Uses w/c when out of bed. Patient still uses and benefits from hospital bed as she is mostly bedbound. \par \par No cough, SOB, vomiting, fever, pain.\par Getting a new electric bed today. \par has 2 adult dtrs - twins \par  private live-in HHAs \par Used to be [facility] \par Dtrs request only labs PRN based on symptoms as patient has difficult veins.

## 2022-09-23 NOTE — PHYSICAL EXAM
[No Acute Distress] : no acute distress [Well Nourished] : well nourished [Well Developed] : well developed [Normal Sclera/Conjunctiva] : normal sclera/conjunctiva [Normal Outer Ear/Nose] : the ears and nose were normal in appearance [Supple] : the neck was supple [No Respiratory Distress] : no respiratory distress [Clear to Auscultation] : lungs were clear to auscultation bilaterally [No Accessory Muscle Use] : no accessory muscle use [Normal Rate] : heart rate was normal  [Regular Rhythm] : with a regular rhythm [Normal S1, S2] : normal S1 and S2 [No Edema] : there was no peripheral edema [Non Tender] : non-tender [Soft] : abdomen soft [Not Distended] : not distended [No Joint Swelling] : no joint swelling seen [No Rash] : no rash [No Gross Sensory Deficits] : no gross sensory deficits [Normal Affect] : the affect was normal [Normal Mood] : the mood was normal [de-identified] : requires assistance to ambulate  [de-identified] : Stage 3 pressure ulcer right heel, wound left shin  [de-identified] : confused at baseline

## 2022-09-23 NOTE — ADDENDUM
[FreeTextEntry1] : -Patient has hospital bed & zac lift. Both are used in the home to aide patient's care.

## 2022-09-23 NOTE — HEALTH RISK ASSESSMENT
[HRA Reviewed] : Health risk assessment reviewed [Full assistance needed] : managing finances [Any fall with injury in past year] : Patient reported fall with injury in the past year [Yes] : The patient does have visual impairment [[employer]Up[employer]] : n/a  [de-identified] : only uses reading glasses

## 2022-09-30 ENCOUNTER — NON-APPOINTMENT (OUTPATIENT)
Age: 87
End: 2022-09-30

## 2022-10-03 ENCOUNTER — APPOINTMENT (OUTPATIENT)
Dept: HOME HEALTH SERVICES | Facility: HOME HEALTH | Age: 87
End: 2022-10-03

## 2022-10-20 ENCOUNTER — TRANSCRIPTION ENCOUNTER (OUTPATIENT)
Age: 87
End: 2022-10-20

## 2022-10-20 ENCOUNTER — NON-APPOINTMENT (OUTPATIENT)
Age: 87
End: 2022-10-20

## 2022-10-21 ENCOUNTER — LABORATORY RESULT (OUTPATIENT)
Age: 87
End: 2022-10-21

## 2022-10-24 ENCOUNTER — NON-APPOINTMENT (OUTPATIENT)
Age: 87
End: 2022-10-24

## 2022-10-30 ENCOUNTER — TRANSCRIPTION ENCOUNTER (OUTPATIENT)
Age: 87
End: 2022-10-30

## 2022-10-30 RX ORDER — CIPROFLOXACIN 250 MG/5ML
250 MG/5ML KIT ORAL
Qty: 1 | Refills: 0 | Status: DISCONTINUED | COMMUNITY
Start: 2022-10-30 | End: 2022-10-30

## 2022-11-07 ENCOUNTER — APPOINTMENT (OUTPATIENT)
Dept: HOME HEALTH SERVICES | Facility: HOME HEALTH | Age: 87
End: 2022-11-07

## 2022-11-07 VITALS
HEART RATE: 65 BPM | DIASTOLIC BLOOD PRESSURE: 54 MMHG | TEMPERATURE: 97.1 F | RESPIRATION RATE: 16 BRPM | SYSTOLIC BLOOD PRESSURE: 104 MMHG | OXYGEN SATURATION: 98 %

## 2022-11-07 DIAGNOSIS — Z23 ENCOUNTER FOR IMMUNIZATION: ICD-10-CM

## 2022-11-07 DIAGNOSIS — L89.613 PRESSURE ULCER OF RIGHT HEEL, STAGE 3: ICD-10-CM

## 2022-11-07 DIAGNOSIS — S91.301A UNSPECIFIED OPEN WOUND, RIGHT FOOT, INITIAL ENCOUNTER: ICD-10-CM

## 2022-11-07 PROCEDURE — 99349 HOME/RES VST EST MOD MDM 40: CPT | Mod: 25

## 2022-11-07 PROCEDURE — 0124A: CPT

## 2022-11-07 RX ORDER — LEVOFLOXACIN 25 MG/ML
25 SOLUTION ORAL
Qty: 150 | Refills: 0 | Status: DISCONTINUED | COMMUNITY
Start: 2022-10-20 | End: 2022-11-07

## 2022-11-07 NOTE — COUNSELING
[Overweight - ( BMI 25.1 - 29.9 )] : overweight -  ( BMI 25.1 - 29.9 ) [Continue diet as tolerated] : continue diet as tolerated based on goals of care [Non - Smoker] : non-smoker [Smoke/CO Detectors] : smoke/CO detectors [Use assistive device to avoid falls] : use assistive device to avoid falls [Not Recommended] : Aspirin use not recommended due to overall prognosis [] : foot exam [Decrease hospital use] : decrease hospital use [Comfort Care] : comfort care [Maintain functional ability] : maintain functional ability [Likely to achieve goals/desired outcomes] : likely to achieve goals/desired outcomes [Patient/Caregiver has ___ understanding of disease process] : patient/caregiver has [unfilled] understanding of disease process [Advanced Directives discussed: ____] : Advanced directives discussed: [unfilled] [Completed Medical Orders for Life-Sustaining Treatment] : completed medical orders for life-sustaining treatment [DNR] : Code Status: DNR [Comfort] : Treatment Guidelines: Comfort [DNI] : Intubation: DNI [[facility]: _____] [facility]/last verification date: [unfilled]

## 2022-11-07 NOTE — PHYSICAL EXAM
[No Acute Distress] : no acute distress [Well Nourished] : well nourished [Well Developed] : well developed [Normal Sclera/Conjunctiva] : normal sclera/conjunctiva [Normal Outer Ear/Nose] : the ears and nose were normal in appearance [Supple] : the neck was supple [No Respiratory Distress] : no respiratory distress [No Accessory Muscle Use] : no accessory muscle use [Clear to Auscultation] : lungs were clear to auscultation bilaterally [Normal Rate] : heart rate was normal  [Regular Rhythm] : with a regular rhythm [Normal S1, S2] : normal S1 and S2 [No Edema] : there was no peripheral edema [Non Tender] : non-tender [Soft] : abdomen soft [Not Distended] : not distended [No Joint Swelling] : no joint swelling seen [No Rash] : no rash [Normal Affect] : the affect was normal [No Gross Sensory Deficits] : no gross sensory deficits [Normal Mood] : the mood was normal [de-identified] : requires assistance to ambulate  [de-identified] : wound left shin [de-identified] : confused at baseline

## 2022-11-07 NOTE — HEALTH RISK ASSESSMENT
[HRA Reviewed] : Health risk assessment reviewed [Full assistance needed] : managing finances [Yes] : The patient does have visual impairment [Patient not ambulatory (Wheelchair)] : Patient is not ambulatory (Wheelchair) [TimeGetUpGo] : 0 [de-identified] : only uses reading glasses

## 2022-11-07 NOTE — HISTORY OF PRESENT ILLNESS
[Family Member] : family member [Formal Caregiver] : formal caregiver [FreeTextEntry1] : dementia  [FreeTextEntry2] : COVID-19 Screen - 2022 \par N95 mask, gloves, eyewear, and gown (if indicated) used during visit: Yes \par Patient or caregiver denies fever, cough, trouble breathing, rash, and vomiting. Patient has not been in close contact with anyone who is COVID-19 positive or suspected of having COVID-19. \par \par This is a 89 yo F from [place], hospice graduate (), w/ PMHx Holocaust survivor, dementia, afib (on eilqi), CAD s/p cardiac stents in , PVD, toe gangrene s/p 2 toe amputations, chronic lower extremity wound, s/p double mastectomy for cystic breast (). Patient being seen for a routine follow up for the management of chronic conditions. \par \par -Stage 3 pressure ulcer right heel: Recently opened up again, HHA was advised to apply Mupirocin as drainage was purulent and foul smelling. Now healed. Advised to offload pressure to area with pillow. \par -Wound left shin: ~2 x 1 cm. Has history of severe, long-healing leg ulcers. Current wound care: wound cleanser, pat dry, apply xeroform gauze, cover with gauze, bhavani wrap.\par -Arthritis: adama in hand in back, relieved by Tylenol PRN.  \par -Left lower leg swelling: try to elevate leg, on Lasix. \par -Chronic depression/anxiety: declines medications \par \par Less conversational now. Will talk a lot about father and sisters, who have passed. \par \par Appetite/dysphagia/weight: Robust. Eats high-calorie foods at breakfast.. No dysphagia. Has native teeth. Eats regular food. Weight has now stabilized\par BMs: Every morning. \par Functional incontinence: Doesn't like using diapers. Unable to get to toilet or commode on time when has urge. \par Sleep: Sleeps during night. Sleeps as well during day. Sleeps from 8 AM to 1 PM. \par Mood: Has sundowning, sometimes agitated, much calmer more often now, agitation much approved, occasionally has visual hallucinations (ie., sees  family members),does not become agitated (i.e. thinks whose  passed away 20 years ago is still alive, recognizes daughters), has been on paxil in remote past, but family does not recall if effective. In past, seroquel has increased agitation. Was sleepy on celexa. Per dtr, it's manageable. Not as combative \par \par Gait/Falls: Uses Kathi lift now. Fell in 2020 and fractured 2 ribs. Denies pain at this time \par \par DME: Uses w/c when out of bed. Patient still uses and benefits from hospital bed as she is mostly bedbound. \par \par No cough, SOB, vomiting, fever, pain.\par Getting a new electric bed today. \par has 2 adult dtrs - twins \par  private live-in HHAs \par Used to be [facility] \par Dtrs request only labs PRN based on symptoms as patient has difficult veins. \par \par ---------------------------------------------------------------\par \par PFIZER BOOSTER ADMINISTERED DURING VISIT \par \par YOGESH JAMESON educated CHERELLETE BLOCH or their legal representative about the Pfizer COVID-19 vaccine booster and provided the information sheet.\par \par YOGESH JAMESON educated LOTTE BLOCH or their legal representative that this vaccine booster is recommended to boost the primary vaccine's effectiveness.\par \par YOGESH JAMESON educated that there will be no cost to LOTTE BLOCH for this vaccine and that any monies or benefits for administering the vaccine will be assigned and transferred to the vaccinating provider, including benefits/monies from LOTTE BLOCH's health plan, Medicare or other third parties who are financially responsible for their medical care.\par \par LOTTE BLOCH or their legal representative consents to the release of all information needed (including but not limited to medical records, copies of claims and itemized bills) to verify payment and as needed for other public health purposes, including reporting to applicable vaccine registries.\par \par YOGESH JAMESON provided LOTTE BLOCH or their legal representative a chance to ask questions and reviewed the benefits and risks of the vaccination as described. LOTTE BLOCH or their legal representative consents to the administration of the COVID-19 vaccination.\par \par Patient denies fever, cough, trouble breathing, rash, vomiting, diarrhea. Patient has not been in close contact with someone who is COVID positive.\par \par N95 mask, gloves, eye wear and gown (if indicated) used during visit: Y. Total face to face time with patient: 30 minutes. \par \par LOTTE BLOCH LOTTE Nov 15 1923 being screened for COVID-19 vaccine administration visit.\par \par Information received by daughter. \par \par LOTTE BLOCH denies moderate to severe acute illness with or without fever.\par \par LOTTE BLOCH denies having a positive test for COVID-19 and denies being told by a doctor that she has COVID-19 in the past 14 days.\par \par LOTTE BLOCH denies receiving passive antibody therapy (monoclonal antibodies or convalescent plasma) as a treatment for COVID-19 in the past 90 days (3 months).\par \par LOTTE BLOCH denies ever having an immediate allergic reaction of any severity to a prior vaccine or injectable therapy, and denies anaphylaxis from any cause, including polysorbate-80 or polyethylene glycol (Miralax).\par \par LOTTE BLOCH's legal representative is: daughter\par \par Patient screened as above.\par After consent verbally received, the Pfizer-BioNTech COVID vaccine vial was gently swirled for 10 seconds in an upright position and in an upright position, using aseptic technique, a 0.3 mL dose was drawn up into a 1 mL syringe. The left deltoid was prepped with alcohol swab, and vaccine was administered at a 90 degree angle.  A bandage was applied.\par \par Patient was monitored for 15 minutes afterwards. Complications included: none.

## 2022-12-01 ENCOUNTER — APPOINTMENT (OUTPATIENT)
Dept: HOME HEALTH SERVICES | Facility: HOME HEALTH | Age: 87
End: 2022-12-01

## 2022-12-01 VITALS
DIASTOLIC BLOOD PRESSURE: 68 MMHG | RESPIRATION RATE: 18 BRPM | OXYGEN SATURATION: 98 % | TEMPERATURE: 97.1 F | SYSTOLIC BLOOD PRESSURE: 118 MMHG | HEART RATE: 62 BPM

## 2022-12-01 RX ORDER — BISMUTH TRIBROMOPH/PETROLATUM 1"X8"
BANDAGE TOPICAL
Refills: 0 | Status: COMPLETED | COMMUNITY
Start: 2021-01-25 | End: 2022-12-01

## 2022-12-06 ENCOUNTER — RX RENEWAL (OUTPATIENT)
Age: 87
End: 2022-12-06

## 2022-12-20 ENCOUNTER — TRANSCRIPTION ENCOUNTER (OUTPATIENT)
Age: 87
End: 2022-12-20

## 2022-12-20 ENCOUNTER — NON-APPOINTMENT (OUTPATIENT)
Age: 87
End: 2022-12-20

## 2022-12-27 ENCOUNTER — NON-APPOINTMENT (OUTPATIENT)
Age: 87
End: 2022-12-27

## 2022-12-30 ENCOUNTER — APPOINTMENT (OUTPATIENT)
Dept: HOME HEALTH SERVICES | Facility: HOME HEALTH | Age: 87
End: 2022-12-30

## 2022-12-30 VITALS
TEMPERATURE: 98.1 F | HEART RATE: 70 BPM | DIASTOLIC BLOOD PRESSURE: 56 MMHG | RESPIRATION RATE: 18 BRPM | SYSTOLIC BLOOD PRESSURE: 107 MMHG | OXYGEN SATURATION: 95 %

## 2022-12-30 DIAGNOSIS — L89.610 PRESSURE ULCER OF RIGHT HEEL, UNSTAGEABLE: ICD-10-CM

## 2023-01-03 ENCOUNTER — APPOINTMENT (OUTPATIENT)
Dept: HOME HEALTH SERVICES | Facility: HOME HEALTH | Age: 88
End: 2023-01-03

## 2023-01-17 ENCOUNTER — FORM ENCOUNTER (OUTPATIENT)
Age: 88
End: 2023-01-17

## 2023-01-18 ENCOUNTER — TRANSCRIPTION ENCOUNTER (OUTPATIENT)
Age: 88
End: 2023-01-18

## 2023-01-18 ENCOUNTER — NON-APPOINTMENT (OUTPATIENT)
Age: 88
End: 2023-01-18

## 2023-01-19 ENCOUNTER — NON-APPOINTMENT (OUTPATIENT)
Age: 88
End: 2023-01-19

## 2023-01-19 RX ORDER — CEPHALEXIN 500 MG/1
500 TABLET ORAL
Qty: 10 | Refills: 0 | Status: DISCONTINUED | COMMUNITY
Start: 2022-12-30 | End: 2023-01-19

## 2023-01-30 ENCOUNTER — TRANSCRIPTION ENCOUNTER (OUTPATIENT)
Age: 88
End: 2023-01-30

## 2023-01-31 ENCOUNTER — NON-APPOINTMENT (OUTPATIENT)
Age: 88
End: 2023-01-31

## 2023-01-31 ENCOUNTER — APPOINTMENT (OUTPATIENT)
Dept: HOME HEALTH SERVICES | Facility: HOME HEALTH | Age: 88
End: 2023-01-31

## 2023-01-31 VITALS
RESPIRATION RATE: 19 BRPM | OXYGEN SATURATION: 95 % | HEART RATE: 68 BPM | SYSTOLIC BLOOD PRESSURE: 110 MMHG | TEMPERATURE: 97.9 F | DIASTOLIC BLOOD PRESSURE: 54 MMHG

## 2023-03-31 ENCOUNTER — APPOINTMENT (OUTPATIENT)
Dept: HOME HEALTH SERVICES | Facility: HOME HEALTH | Age: 88
End: 2023-03-31

## 2023-04-14 ENCOUNTER — TRANSCRIPTION ENCOUNTER (OUTPATIENT)
Age: 88
End: 2023-04-14

## 2023-04-14 ENCOUNTER — NON-APPOINTMENT (OUTPATIENT)
Age: 88
End: 2023-04-14

## 2023-05-18 ENCOUNTER — APPOINTMENT (OUTPATIENT)
Dept: HOME HEALTH SERVICES | Facility: HOME HEALTH | Age: 88
End: 2023-05-18
Payer: MEDICARE

## 2023-05-18 VITALS
DIASTOLIC BLOOD PRESSURE: 60 MMHG | RESPIRATION RATE: 16 BRPM | OXYGEN SATURATION: 96 % | SYSTOLIC BLOOD PRESSURE: 110 MMHG | HEART RATE: 64 BPM | TEMPERATURE: 97 F

## 2023-05-18 DIAGNOSIS — R50.9 FEVER, UNSPECIFIED: ICD-10-CM

## 2023-05-18 PROCEDURE — 99349 HOME/RES VST EST MOD MDM 40: CPT

## 2023-05-18 NOTE — PHYSICAL EXAM
[No Acute Distress] : no acute distress [Normal Sclera/Conjunctiva] : normal sclera/conjunctiva [Normal Outer Ear/Nose] : the ears and nose were normal in appearance [Supple] : the neck was supple [No Respiratory Distress] : no respiratory distress [Clear to Auscultation] : lungs were clear to auscultation bilaterally [No Accessory Muscle Use] : no accessory muscle use [Normal Rate] : heart rate was normal  [Regular Rhythm] : with a regular rhythm [Normal S1, S2] : normal S1 and S2 [No Edema] : there was no peripheral edema [Non Tender] : non-tender [Soft] : abdomen soft [Not Distended] : not distended [No Joint Swelling] : no joint swelling seen [No Rash] : no rash [No Gross Sensory Deficits] : no gross sensory deficits [Normal Affect] : the affect was normal [Normal Mood] : the mood was normal [Normal Bowel Sounds] : normal bowel sounds [de-identified] : dependant on all ADL's [de-identified] : scab to right heel; senile purpura to bilateral arms and legs  [de-identified] : confused at baseline

## 2023-05-18 NOTE — REASON FOR VISIT
[Follow-Up] : a follow-up visit [Family Member] : family member [Pre-Visit Preparation] : pre-visit preparation was done [Intercurrent Specialty/Sub-specialty Visits] : the patient has no intercurrent specialty/sub-specialty visits [FreeTextEntry1] : dementia [FreeTextEntry2] : Chart Review

## 2023-05-18 NOTE — COUNSELING
[Overweight - ( BMI 25.1 - 29.9 )] : overweight -  ( BMI 25.1 - 29.9 ) [Continue diet as tolerated] : continue diet as tolerated based on goals of care [Non - Smoker] : non-smoker [Smoke/CO Detectors] : smoke/CO detectors [Use assistive device to avoid falls] : use assistive device to avoid falls [] : foot exam [Not Recommended] : Aspirin use not recommended due to overall prognosis [Decrease hospital use] : decrease hospital use [Comfort Care] : comfort care [Maintain functional ability] : maintain functional ability [Likely to achieve goals/desired outcomes] : likely to achieve goals/desired outcomes [Patient/Caregiver has ___ understanding of disease process] : patient/caregiver has [unfilled] understanding of disease process [Advanced Directives discussed: ____] : Advanced directives discussed: [unfilled] [Completed Medical Orders for Life-Sustaining Treatment] : completed medical orders for life-sustaining treatment [DNR] : Code Status: DNR [Comfort] : Treatment Guidelines: Comfort [DNI] : Intubation: DNI [[facility]: _____] [facility] Com[facility]/last verification date: [unfilled]

## 2023-05-18 NOTE — HISTORY OF PRESENT ILLNESS
[Family Member] : family member [Formal Caregiver] : formal caregiver [Patient is stable] : patient is stable [FreeTextEntry1] : dementia  [FreeTextEntry2] : COVID-19 Screen - 11/07/2022 \par N95 mask, gloves, eyewear, and gown (if indicated) used during visit: Yes \par Patient or caregiver denies fever, cough, trouble breathing, rash, and vomiting. Patient has not been in close contact with anyone who is COVID-19 positive or suspected of having COVID-19. \par \par This is a 89 yo F from [place], hospice graduate (2020), w/ PMHx Holocaust survivor, dementia, afib (on eilqiuis), CAD s/p cardiac stents in 2003, PVD, toe gangrene s/p 2 toe amputations, chronic lower extremity wound, s/p double mastectomy for cystic breast (1970s). Patient being seen for a routine follow up Dementia, CKD, PVD, AFib.\par \par Interval Events:\par - Pt with dementia who is a poor historian, daughter present\par - Offers no new complaints\par -Right heel with scab, using protective booties to offload \par -Arthritis: especially in hand in back, relieved by Tylenol PRN.  \par \par Subjective\par Appetite/dysphagia/weight: Robust. Eats high-calorie foods at breakfast.. No dysphagia. Has native teeth. Eats regular food. Weight has now stabilized\par BMs: Every morning. \par Functional incontinence:Uses diapers\par Sleep: Sleeps during night. Sleeps as well during the day. \par Mood: Has sundowning, sometimes agitated, calm at present \par \par Gait/Falls: Uses Kathi lift now. Fell in 11/2020 and fractured 2 ribs. Denies pain at this time \par \par DME: Uses w/c when out of bed. Patient still uses and benefits from hospital bed as she is mostly bedbound. \par . \par has 2 adult dtrs - twins \par 24/7 private live-in HHAs \par Used to be followed [facility] \par Dtrs request only labs PRN based on symptoms as patient has difficult veins. \par \par ---------------------------------------------------------------\giuliana Andreapar

## 2023-05-18 NOTE — HEALTH RISK ASSESSMENT
[HRA Reviewed] : Health risk assessment reviewed [Full assistance needed] : managing finances [Yes] : The patient does have visual impairment [No falls in past year] : Patient reported no falls in the past year [TimeGetUpGo] : 0 [de-identified] : only uses reading glasses

## 2023-06-05 ENCOUNTER — RX RENEWAL (OUTPATIENT)
Age: 88
End: 2023-06-05

## 2023-06-09 ENCOUNTER — NON-APPOINTMENT (OUTPATIENT)
Age: 88
End: 2023-06-09

## 2023-07-10 ENCOUNTER — APPOINTMENT (OUTPATIENT)
Dept: HOME HEALTH SERVICES | Facility: HOME HEALTH | Age: 88
End: 2023-07-10

## 2023-08-14 ENCOUNTER — NON-APPOINTMENT (OUTPATIENT)
Age: 88
End: 2023-08-14

## 2023-08-21 ENCOUNTER — APPOINTMENT (OUTPATIENT)
Dept: HOME HEALTH SERVICES | Facility: HOME HEALTH | Age: 88
End: 2023-08-21
Payer: MEDICARE

## 2023-08-21 VITALS
TEMPERATURE: 97.6 F | HEART RATE: 66 BPM | RESPIRATION RATE: 18 BRPM | SYSTOLIC BLOOD PRESSURE: 120 MMHG | DIASTOLIC BLOOD PRESSURE: 70 MMHG

## 2023-08-21 PROCEDURE — 99349 HOME/RES VST EST MOD MDM 40: CPT

## 2023-08-21 NOTE — HISTORY OF PRESENT ILLNESS
[Patient is stable] : patient is stable [Family Member] : family member [Formal Caregiver] : formal caregiver [FreeTextEntry1] : dementia  [FreeTextEntry2] : COVID-19 Screen - 11/07/2022  N95 mask, gloves, eyewear, and gown (if indicated) used during visit: Yes  Patient or caregiver denies fever, cough, trouble breathing, rash, and vomiting. Patient has not been in close contact with anyone who is COVID-19 positive or suspected of having COVID-19.   This is a 89 yo F from [place], hospice graduate (2020), w/ PMHx Holocaust survivor, dementia, afib (on eilqiuis), CAD s/p cardiac stents in 2003, PVD, toe gangrene s/p 2 toe amputations, chronic lower extremity wound, s/p double mastectomy for cystic breast (1970s). Patient being seen for a routine follow up of chronic medical conditions.   Interval Events: - Pt with dementia who is bedbound and a poor historian, daughter Lashay present - recently saw dentist with no changes - 2 teeth that had root canals were breaking, no nerve present - dentist removed loose fragments - Offers no new complaints -Right heel with healed, using protective booties to offload -Arthritis: especially in hand in back, relieved by Tylenol PRN.    Subjective Appetite/dysphagia/weight: Good, breakfast best, lunch and dinner not as good. No dysphagia. Has native teeth. Eats regular food. Weight has now stabilized BMs: Every morning.  Functional incontinence:Uses diapers Sleep: Does not sleep through the night. Sleeps as well during the day.  Mood: Has sundowning, sometimes agitated, calm at present   Gait/Falls: Uses Kathi lift. Fell in 11/2020 and fractured 2 ribs. Denies pain at this time   DME: Uses w/c when out of bed. Patient still uses and benefits from hospital bed as she is mostly bedbound.  .  has 2 adult dtrs - twins  24/7 private live-in HHAs  Used to be [facility]  Dtrs request only labs PRN based on symptoms as patient has difficult veins.   ---------------------------------------------------------------

## 2023-08-21 NOTE — COUNSELING
[Overweight - ( BMI 25.1 - 29.9 )] : overweight -  ( BMI 25.1 - 29.9 ) [Continue diet as tolerated] : continue diet as tolerated based on goals of care [Non - Smoker] : non-smoker [Smoke/CO Detectors] : smoke/CO detectors [Use assistive device to avoid falls] : use assistive device to avoid falls [] : foot exam [Not Recommended] : Aspirin use not recommended due to overall prognosis [Decrease hospital use] : decrease hospital use [Comfort Care] : comfort care [Maintain functional ability] : maintain functional ability [Likely to achieve goals/desired outcomes] : likely to achieve goals/desired outcomes [Patient/Caregiver has ___ understanding of disease process] : patient/caregiver has [unfilled] understanding of disease process [Advanced Directives discussed: ____] : Advanced directives discussed: [unfilled] [Completed Medical Orders for Life-Sustaining Treatment] : completed medical orders for life-sustaining treatment [DNR] : Code Status: DNR [Comfort] : Treatment Guidelines: Comfort [DNI] : Intubation: DNI [[facility]: _____] [facility] Com[facility]/last verification date: [unfilled]

## 2023-08-21 NOTE — HEALTH RISK ASSESSMENT
[HRA Reviewed] : Health risk assessment reviewed [Full assistance needed] : managing finances [Yes] : The patient does have visual impairment [Patient not ambulatory (Wheelchair)] : Patient is not ambulatory (Wheelchair) [TimeGetUpGo] : 0 [de-identified] : only uses reading glasses

## 2023-08-21 NOTE — PHYSICAL EXAM
[No Acute Distress] : no acute distress [Normal Sclera/Conjunctiva] : normal sclera/conjunctiva [Normal Outer Ear/Nose] : the ears and nose were normal in appearance [Supple] : the neck was supple [No Respiratory Distress] : no respiratory distress [Clear to Auscultation] : lungs were clear to auscultation bilaterally [No Accessory Muscle Use] : no accessory muscle use [Normal Rate] : heart rate was normal  [Regular Rhythm] : with a regular rhythm [Normal S1, S2] : normal S1 and S2 [No Edema] : there was no peripheral edema [Normal Bowel Sounds] : normal bowel sounds [Non Tender] : non-tender [Soft] : abdomen soft [Not Distended] : not distended [No Joint Swelling] : no joint swelling seen [No Rash] : no rash [No Gross Sensory Deficits] : no gross sensory deficits [Normal Affect] : the affect was normal [Normal Mood] : the mood was normal [de-identified] : dependant on all ADL's [de-identified] : scab to right heel healed; senile purpura to bilateral arms and legs  [de-identified] : confused at baseline

## 2023-09-08 ENCOUNTER — NON-APPOINTMENT (OUTPATIENT)
Age: 88
End: 2023-09-08

## 2023-09-09 ENCOUNTER — TRANSCRIPTION ENCOUNTER (OUTPATIENT)
Age: 88
End: 2023-09-09

## 2023-09-11 ENCOUNTER — APPOINTMENT (OUTPATIENT)
Dept: HOME HEALTH SERVICES | Facility: HOME HEALTH | Age: 88
End: 2023-09-11

## 2023-09-11 ENCOUNTER — TRANSCRIPTION ENCOUNTER (OUTPATIENT)
Age: 88
End: 2023-09-11

## 2023-09-11 VITALS
SYSTOLIC BLOOD PRESSURE: 114 MMHG | RESPIRATION RATE: 18 BRPM | OXYGEN SATURATION: 96 % | DIASTOLIC BLOOD PRESSURE: 60 MMHG | HEART RATE: 64 BPM | TEMPERATURE: 87.5 F

## 2023-09-29 ENCOUNTER — NON-APPOINTMENT (OUTPATIENT)
Age: 88
End: 2023-09-29

## 2023-10-03 ENCOUNTER — APPOINTMENT (OUTPATIENT)
Dept: HOME HEALTH SERVICES | Facility: HOME HEALTH | Age: 88
End: 2023-10-03

## 2023-10-11 ENCOUNTER — RX RENEWAL (OUTPATIENT)
Age: 88
End: 2023-10-11

## 2023-10-17 ENCOUNTER — NON-APPOINTMENT (OUTPATIENT)
Age: 88
End: 2023-10-17

## 2023-10-17 DIAGNOSIS — Z20.822 CONTACT WITH AND (SUSPECTED) EXPOSURE TO COVID-19: ICD-10-CM

## 2023-10-17 RX ORDER — AZITHROMYCIN 250 MG/1
250 TABLET, FILM COATED ORAL
Qty: 6 | Refills: 0 | Status: ACTIVE | COMMUNITY
Start: 2023-10-17 | End: 1900-01-01

## 2023-10-19 ENCOUNTER — NON-APPOINTMENT (OUTPATIENT)
Age: 88
End: 2023-10-19

## 2023-10-19 ENCOUNTER — APPOINTMENT (OUTPATIENT)
Dept: HOME HEALTH SERVICES | Facility: HOME HEALTH | Age: 88
End: 2023-10-19
Payer: MEDICARE

## 2023-10-19 ENCOUNTER — TRANSCRIPTION ENCOUNTER (OUTPATIENT)
Age: 88
End: 2023-10-19

## 2023-10-19 DIAGNOSIS — I50.23 ACUTE ON CHRONIC SYSTOLIC (CONGESTIVE) HEART FAILURE: ICD-10-CM

## 2023-10-19 PROCEDURE — 99349 HOME/RES VST EST MOD MDM 40: CPT | Mod: 95

## 2023-10-19 RX ORDER — AMOXICILLIN AND CLAVULANATE POTASSIUM 400; 57 MG/5ML; MG/5ML
400-57 POWDER, FOR SUSPENSION ORAL
Qty: 140 | Refills: 0 | Status: COMPLETED | COMMUNITY
Start: 2023-10-17 | End: 2023-10-19

## 2023-10-19 RX ORDER — MUPIROCIN 20 MG/G
2 OINTMENT TOPICAL TWICE DAILY
Qty: 1 | Refills: 1 | Status: COMPLETED | COMMUNITY
Start: 2023-04-14 | End: 2023-10-19

## 2023-10-20 ENCOUNTER — NON-APPOINTMENT (OUTPATIENT)
Age: 88
End: 2023-10-20

## 2023-10-20 PROBLEM — I50.23 ACUTE ON CHRONIC SYSTOLIC CONGESTIVE HEART FAILURE: Status: ACTIVE | Noted: 2023-10-19

## 2023-10-20 LAB
INFLUENZA A RESULT: NOT DETECTED
INFLUENZA B RESULT: NOT DETECTED
RESP SYN VIRUS RESULT: NOT DETECTED
SARS-COV-2 RESULT: NOT DETECTED

## 2023-10-20 RX ORDER — METOPROLOL TARTRATE 25 MG/1
25 TABLET, FILM COATED ORAL TWICE DAILY
Qty: 180 | Refills: 3 | Status: COMPLETED | COMMUNITY
Start: 2022-04-20 | End: 2023-10-20

## 2023-11-06 ENCOUNTER — NON-APPOINTMENT (OUTPATIENT)
Age: 88
End: 2023-11-06

## 2023-11-09 ENCOUNTER — APPOINTMENT (OUTPATIENT)
Dept: HOME HEALTH SERVICES | Facility: HOME HEALTH | Age: 88
End: 2023-11-09
Payer: MEDICARE

## 2023-11-09 DIAGNOSIS — T14.8XXA OTHER INJURY OF UNSPECIFIED BODY REGION, INITIAL ENCOUNTER: ICD-10-CM

## 2023-11-09 DIAGNOSIS — Z51.5 ENCOUNTER FOR PALLIATIVE CARE: ICD-10-CM

## 2023-11-09 PROCEDURE — 99349 HOME/RES VST EST MOD MDM 40: CPT

## 2023-11-14 ENCOUNTER — TRANSCRIPTION ENCOUNTER (OUTPATIENT)
Age: 88
End: 2023-11-14

## 2023-11-14 ENCOUNTER — NON-APPOINTMENT (OUTPATIENT)
Age: 88
End: 2023-11-14

## 2023-11-14 DIAGNOSIS — L01.00 IMPETIGO, UNSPECIFIED: ICD-10-CM

## 2023-11-16 ENCOUNTER — APPOINTMENT (OUTPATIENT)
Dept: HOME HEALTH SERVICES | Facility: HOME HEALTH | Age: 88
End: 2023-11-16

## 2023-11-16 VITALS
HEART RATE: 73 BPM | SYSTOLIC BLOOD PRESSURE: 117 MMHG | TEMPERATURE: 97.6 F | RESPIRATION RATE: 18 BRPM | DIASTOLIC BLOOD PRESSURE: 73 MMHG | OXYGEN SATURATION: 98 %

## 2023-11-16 RX ORDER — MUPIROCIN 20 MG/G
2 OINTMENT TOPICAL
Qty: 1 | Refills: 0 | Status: ACTIVE | COMMUNITY
Start: 2021-04-08

## 2023-11-16 RX ORDER — MUPIROCIN 20 MG/G
2 OINTMENT TOPICAL 3 TIMES DAILY
Qty: 1 | Refills: 2 | Status: ACTIVE | COMMUNITY
Start: 2023-10-17

## 2023-11-16 RX ORDER — MUPIROCIN 20 MG/G
2 OINTMENT TOPICAL TWICE DAILY
Qty: 22 | Refills: 0 | Status: ACTIVE | COMMUNITY
Start: 2023-11-14

## 2023-11-16 RX ORDER — CHLORHEXIDINE GLUCONATE, 0.12% ORAL RINSE 1.2 MG/ML
0.12 SOLUTION DENTAL
Qty: 946 | Refills: 5 | Status: ACTIVE | COMMUNITY
Start: 2023-06-09

## 2024-01-29 NOTE — COUNSELING
[Overweight - ( BMI 25.1 - 29.9 )] : overweight -  ( BMI 25.1 - 29.9 ) [Continue diet as tolerated] : continue diet as tolerated based on goals of care [Non - Smoker] : non-smoker [Smoke/CO Detectors] : smoke/CO detectors [Use assistive device to avoid falls] : use assistive device to avoid falls [] : foot exam [Not Recommended] : Aspirin use not recommended due to overall prognosis [Decrease hospital use] : decrease hospital use [Comfort Care] : comfort care [Maintain functional ability] : maintain functional ability [Likely to achieve goals/desired outcomes] : likely to achieve goals/desired outcomes [Patient/Caregiver has ___ understanding of disease process] : patient/caregiver has [unfilled] understanding of disease process [Advanced Directives discussed: ____] : Advanced directives discussed: [unfilled] [Completed Medical Orders for Life-Sustaining Treatment] : completed medical orders for life-sustaining treatment [DNR] : Code Status: DNR [Comfort] : Treatment Guidelines: Comfort [DNI] : Intubation: DNI [Last [facility] Date: _____] : [facility] Completion/last verification date: [unfilled]

## 2024-01-30 ENCOUNTER — APPOINTMENT (OUTPATIENT)
Dept: HOME HEALTH SERVICES | Facility: HOME HEALTH | Age: 89
End: 2024-01-30
Payer: MEDICARE

## 2024-01-30 VITALS
TEMPERATURE: 97.6 F | OXYGEN SATURATION: 98 % | SYSTOLIC BLOOD PRESSURE: 110 MMHG | RESPIRATION RATE: 16 BRPM | HEART RATE: 70 BPM | DIASTOLIC BLOOD PRESSURE: 60 MMHG

## 2024-01-30 DIAGNOSIS — I73.9 PERIPHERAL VASCULAR DISEASE, UNSPECIFIED: ICD-10-CM

## 2024-01-30 DIAGNOSIS — N18.30 CHRONIC KIDNEY DISEASE, STAGE 3 UNSPECIFIED: ICD-10-CM

## 2024-01-30 DIAGNOSIS — I48.91 UNSPECIFIED ATRIAL FIBRILLATION: ICD-10-CM

## 2024-01-30 PROCEDURE — 99349 HOME/RES VST EST MOD MDM 40: CPT

## 2024-01-30 NOTE — HEALTH RISK ASSESSMENT
[HRA Reviewed] : Health risk assessment reviewed [Full assistance needed] : managing finances [No falls in past year] : Patient reported no falls in the past year [Yes] : The patient does have visual impairment [TimeGetUpGo] : 0 [de-identified] : only uses reading glasses

## 2024-01-30 NOTE — REASON FOR VISIT
[Follow-Up] : a follow-up visit [Formal Caregiver] : formal caregiver [Pre-Visit Preparation] : pre-visit preparation was done [Intercurrent Specialty/Sub-specialty Visits] : the patient has no intercurrent specialty/sub-specialty visits [FreeTextEntry1] : dementia [FreeTextEntry2] : Chart Review

## 2024-01-30 NOTE — PHYSICAL EXAM
[No Acute Distress] : no acute distress [Normal Sclera/Conjunctiva] : normal sclera/conjunctiva [Normal Outer Ear/Nose] : the ears and nose were normal in appearance [Supple] : the neck was supple [No Respiratory Distress] : no respiratory distress [Clear to Auscultation] : lungs were clear to auscultation bilaterally [No Accessory Muscle Use] : no accessory muscle use [Normal Rate] : heart rate was normal  [Regular Rhythm] : with a regular rhythm [Normal S1, S2] : normal S1 and S2 [No Edema] : there was no peripheral edema [Breast Exam Declined] : patient declined to have breast exam done [Normal Bowel Sounds] : normal bowel sounds [Non Tender] : non-tender [Soft] : abdomen soft [Not Distended] : not distended [Patient Refused] : rectal exam was refused by the patient [No Joint Swelling] : no joint swelling seen [No Rash] : no rash [No Gross Sensory Deficits] : no gross sensory deficits [Normal Affect] : the affect was normal [Normal Mood] : the mood was normal [de-identified] : dependant on all ADL's [de-identified] : right sacral area healed, 2 healing skin tear on right leg and one to left leg - no drainage, no s/s of infection; continue to monitor  [de-identified] : confused at baseline

## 2024-01-30 NOTE — HISTORY OF PRESENT ILLNESS
[Patient is stable] : patient is stable [Family Member] : family member [Formal Caregiver] : formal caregiver [FreeTextEntry1] : dementia  [FreeTextEntry2] : COVID-19 Screen - 11/07/2022  N95 mask, gloves, eyewear, and gown (if indicated) used during visit: Yes  Patient or caregiver denies fever, cough, trouble breathing, rash, and vomiting. Patient has not been in close contact with anyone who is COVID-19 positive or suspected of having COVID-19.   This is a 89 year old Female from [place], hospice graduate (2020), w/ PMHx Holocaust survivor, dementia, afib (on eilqiuis), CAD s/p cardiac stents in 2003, PVD, toe gangrene s/p 2 toe amputations, chronic lower extremity wound, s/p double mastectomy for cystic breast (1970s). Patient being seen for a routine follow up Dementia, CKD, PVD, AFib.  Interval Events: - Aide reports that pt had a loose BM last night x 1, she believes that other aide gave MiraLAX, no further episodes - continue to monitor  - Pt with 2 healing skin tear on right leg and one to left leg - no drainage, no s/s of infection; continue to monitor  - right sacral area 1 cm x 1 cm opening - healed  Subjective Appetite/dysphagia/weight: Robust. Eats high-calorie foods at breakfast.. No dysphagia. Has native teeth. Eats regular food. Weight has now stabilized BMs: Every morning.  Functional incontinence:Uses diapers Sleep: Sleeps during night. Sleeps as well during the day.  Mood: Has sundowning, sometimes agitated, calm at present   Gait/Falls: Uses Kathi lift now. No recent falls.  DME: Uses w/c when out of bed. Patient still uses and benefits from hospital bed as she is mostly bedbound.  .  has 2 adult dtrs - twins  24/7 private live-in HHAs  Used to be [facility]  Dtrs request only labs PRN based on symptoms as patient has difficult veins.   ---------------------------------------------------------------

## 2024-02-22 ENCOUNTER — TRANSCRIPTION ENCOUNTER (OUTPATIENT)
Age: 89
End: 2024-02-22

## 2024-02-27 ENCOUNTER — NON-APPOINTMENT (OUTPATIENT)
Age: 89
End: 2024-02-27

## 2024-03-03 ENCOUNTER — NON-APPOINTMENT (OUTPATIENT)
Age: 89
End: 2024-03-03

## 2024-03-03 ENCOUNTER — TRANSCRIPTION ENCOUNTER (OUTPATIENT)
Age: 89
End: 2024-03-03

## 2024-03-04 ENCOUNTER — APPOINTMENT (OUTPATIENT)
Dept: HOME HEALTH SERVICES | Facility: HOME HEALTH | Age: 89
End: 2024-03-04

## 2024-03-04 VITALS
OXYGEN SATURATION: 99 % | TEMPERATURE: 97.7 F | HEART RATE: 73 BPM | RESPIRATION RATE: 15 BRPM | SYSTOLIC BLOOD PRESSURE: 102 MMHG | DIASTOLIC BLOOD PRESSURE: 63 MMHG

## 2024-03-04 RX ORDER — APIXABAN 2.5 MG/1
2.5 TABLET, FILM COATED ORAL
Qty: 180 | Refills: 3 | Status: ACTIVE | COMMUNITY

## 2024-03-04 RX ORDER — FUROSEMIDE 40 MG/1
40 TABLET ORAL
Qty: 90 | Refills: 3 | Status: ACTIVE | COMMUNITY
Start: 2021-12-17

## 2024-03-04 RX ORDER — SILVER 2" X 2"
0.9 BANDAGE TOPICAL
Qty: 3 | Refills: 3 | Status: ACTIVE | COMMUNITY
Start: 2022-12-30

## 2024-03-04 RX ORDER — SENNOSIDES 8.6 MG TABLETS 8.6 MG/1
8.6 TABLET ORAL
Qty: 60 | Refills: 3 | Status: ACTIVE | COMMUNITY
Start: 2021-01-25

## 2024-03-04 RX ORDER — METOPROLOL SUCCINATE 25 MG/1
25 TABLET, EXTENDED RELEASE ORAL
Qty: 90 | Refills: 3 | Status: ACTIVE | COMMUNITY
Start: 2023-10-19

## 2024-04-10 ENCOUNTER — NON-APPOINTMENT (OUTPATIENT)
Age: 89
End: 2024-04-10

## 2024-04-10 RX ORDER — CIPROFLOXACIN HYDROCHLORIDE 500 MG/1
500 TABLET, FILM COATED ORAL
Qty: 14 | Refills: 0 | Status: ACTIVE | COMMUNITY
Start: 2024-02-27 | End: 1900-01-01

## 2024-04-22 ENCOUNTER — APPOINTMENT (OUTPATIENT)
Dept: HOME HEALTH SERVICES | Facility: HOME HEALTH | Age: 89
End: 2024-04-22
Payer: MEDICARE

## 2024-04-22 VITALS — HEART RATE: 70 BPM | OXYGEN SATURATION: 96 % | RESPIRATION RATE: 16 BRPM | TEMPERATURE: 97.6 F

## 2024-04-22 DIAGNOSIS — R53.2 FUNCTIONAL QUADRIPLEGIA: ICD-10-CM

## 2024-04-22 DIAGNOSIS — R52 PAIN, UNSPECIFIED: ICD-10-CM

## 2024-04-22 DIAGNOSIS — K08.89 OTHER SPECIFIED DISORDERS OF TEETH AND SUPPORTING STRUCTURES: ICD-10-CM

## 2024-04-22 DIAGNOSIS — N39.0 URINARY TRACT INFECTION, SITE NOT SPECIFIED: ICD-10-CM

## 2024-04-22 DIAGNOSIS — F03.90 UNSPECIFIED DEMENTIA W/OUT BEHAVIORAL DISTURBANCE: ICD-10-CM

## 2024-04-22 PROCEDURE — 99349 HOME/RES VST EST MOD MDM 40: CPT

## 2024-04-22 RX ORDER — TRAMADOL HYDROCHLORIDE 50 MG/1
50 TABLET, COATED ORAL
Qty: 90 | Refills: 0 | Status: ACTIVE | COMMUNITY
Start: 2024-04-22 | End: 1900-01-01

## 2024-04-22 NOTE — PHYSICAL EXAM
[No Acute Distress] : no acute distress [Normal Sclera/Conjunctiva] : normal sclera/conjunctiva [Normal Outer Ear/Nose] : the ears and nose were normal in appearance [Supple] : the neck was supple [No Respiratory Distress] : no respiratory distress [Clear to Auscultation] : lungs were clear to auscultation bilaterally [No Accessory Muscle Use] : no accessory muscle use [Normal Rate] : heart rate was normal  [Regular Rhythm] : with a regular rhythm [Normal S1, S2] : normal S1 and S2 [No Edema] : there was no peripheral edema [Breast Exam Declined] : patient declined to have breast exam done [Normal Bowel Sounds] : normal bowel sounds [Non Tender] : non-tender [Soft] : abdomen soft [Not Distended] : not distended [Patient Refused] : rectal exam was refused by the patient [No Joint Swelling] : no joint swelling seen [No Rash] : no rash [No Gross Sensory Deficits] : no gross sensory deficits [Normal Affect] : the affect was normal [Normal Mood] : the mood was normal [de-identified] : dependant on all ADL's [de-identified] : right sacral area healing, 2 healing skin tear on right leg and one to left leg - no drainage, no s/s of infection; continue to monitor  [de-identified] : confused at baseline

## 2024-04-22 NOTE — COUNSELING
[Overweight - ( BMI 25.1 - 29.9 )] : overweight -  ( BMI 25.1 - 29.9 ) [Continue diet as tolerated] : continue diet as tolerated based on goals of care [Non - Smoker] : non-smoker [Smoke/CO Detectors] : smoke/CO detectors [Use assistive device to avoid falls] : use assistive device to avoid falls [] : foot exam [Not Recommended] : Aspirin use not recommended due to overall prognosis [Decrease hospital use] : decrease hospital use [Comfort Care] : comfort care [Maintain functional ability] : maintain functional ability [Likely to achieve goals/desired outcomes] : likely to achieve goals/desired outcomes [Patient/Caregiver has ___ understanding of disease process] : patient/caregiver has [unfilled] understanding of disease process [Advanced Directives discussed: ____] : Advanced directives discussed: [unfilled] [Completed Medical Orders for Life-Sustaining Treatment] : completed medical orders for life-sustaining treatment [DNR] : Code Status: DNR [Comfort] : Treatment Guidelines: Comfort [DNI] : Intubation: DNI [Last [facility] Date: _____] : [facility] Completion/last verification date: [unfilled]

## 2024-04-22 NOTE — HISTORY OF PRESENT ILLNESS
[Patient is stable] : patient is stable [Family Member] : family member [Formal Caregiver] : formal caregiver [FreeTextEntry1] : dementia  [FreeTextEntry2] : This is a 89-year-old Female from [place], hospice graduate (2020), w/ PMHx Holocaust survivor, dementia, afib (on eilqiuis), CAD s/p cardiac stents in 2003, PVD, toe gangrene s/p 2 toe amputations, chronic lower extremity wound, s/p double mastectomy for cystic breast (1970s). Patient being seen for a routine follow up  Interval Events: - Pt groaning with pain at times when turned and positioned -Start Tramadol 25 mgs Q8H as needed  - Pt recently treated for UTI with antibiotics - pt no longer with increased confusion or any other urinary symptoms.  - Pt with 2 healing skin tear on right leg and one to left leg - no drainage, no s/s of infection; continue to monitor  - right sacral area - healing  Subjective Appetite/dysphagia/weight: Robust. Eats high-calorie foods at breakfast.. No dysphagia. Has native teeth. Eats regular food. Weight has now stabilized BMs: Every morning.  Functional incontinence: Uses diapers Sleep: Sleeps during night. Sleeps as well during the day.  Mood: Has sundowning, sometimes agitated, calm at present   Gait/Falls: Uses Kathi lift now. No recent falls.  DME: Uses w/c when out of bed. Patient still uses and benefits from hospital bed as she is mostly bedbound.  .  has 2 adult dtrs - twins  24/7 private live-in HHAs  Used to be [facility]  Dtrs request only labs PRN based on symptoms as patient has difficult veins.   ---------------------------------------------------------------

## 2024-04-22 NOTE — HEALTH RISK ASSESSMENT
[HRA Reviewed] : Health risk assessment reviewed [Full assistance needed] : managing finances [No falls in past year] : Patient reported no falls in the past year [Yes] : The patient does have visual impairment [TimeGetUpGo] : 0 [de-identified] : only uses reading glasses

## 2024-06-18 ENCOUNTER — NON-APPOINTMENT (OUTPATIENT)
Age: 89
End: 2024-06-18

## 2024-06-18 ENCOUNTER — APPOINTMENT (OUTPATIENT)
Dept: HOME HEALTH SERVICES | Facility: HOME HEALTH | Age: 89
End: 2024-06-18

## 2024-06-25 ENCOUNTER — TRANSCRIPTION ENCOUNTER (OUTPATIENT)
Age: 89
End: 2024-06-25

## 2024-06-25 ENCOUNTER — NON-APPOINTMENT (OUTPATIENT)
Age: 89
End: 2024-06-25

## 2024-07-15 ENCOUNTER — RX RENEWAL (OUTPATIENT)
Age: 89
End: 2024-07-15

## 2024-09-03 ENCOUNTER — NON-APPOINTMENT (OUTPATIENT)
Age: 89
End: 2024-09-03

## 2024-09-03 ENCOUNTER — APPOINTMENT (OUTPATIENT)
Dept: HOME HEALTH SERVICES | Facility: HOME HEALTH | Age: 89
End: 2024-09-03
